# Patient Record
Sex: MALE | Race: WHITE | Employment: OTHER | URBAN - METROPOLITAN AREA
[De-identification: names, ages, dates, MRNs, and addresses within clinical notes are randomized per-mention and may not be internally consistent; named-entity substitution may affect disease eponyms.]

---

## 2024-02-09 RX ORDER — NITROGLYCERIN 0.4 MG/1
0.4 TABLET SUBLINGUAL
COMMUNITY
Start: 2024-01-20 | End: 2025-01-19

## 2024-02-09 RX ORDER — TRAZODONE HYDROCHLORIDE 100 MG/1
100 TABLET ORAL NIGHTLY
COMMUNITY
Start: 2024-01-05

## 2024-02-09 RX ORDER — LOSARTAN POTASSIUM 25 MG/1
12.5 TABLET ORAL DAILY
COMMUNITY
Start: 2024-01-20 | End: 2024-02-19

## 2024-02-09 RX ORDER — ASPIRIN 81 MG/1
81 TABLET ORAL DAILY
COMMUNITY
Start: 2024-01-20 | End: 2024-02-19

## 2024-02-09 RX ORDER — QUETIAPINE FUMARATE 50 MG/1
1 TABLET, FILM COATED ORAL NIGHTLY
COMMUNITY
Start: 2024-01-05

## 2024-02-09 RX ORDER — MIRTAZAPINE 30 MG/1
30 TABLET, FILM COATED ORAL NIGHTLY
COMMUNITY
Start: 2024-01-05

## 2024-02-09 RX ORDER — LORAZEPAM 0.5 MG/1
0.5 TABLET ORAL NIGHTLY
COMMUNITY
Start: 2024-01-05

## 2024-02-09 RX ORDER — TAMSULOSIN HYDROCHLORIDE 0.4 MG/1
0.4 CAPSULE ORAL NIGHTLY
COMMUNITY
Start: 2024-01-05

## 2024-02-09 RX ORDER — CARVEDILOL 3.12 MG/1
3.12 TABLET ORAL 2 TIMES DAILY WITH MEALS
COMMUNITY
Start: 2024-01-05

## 2024-02-21 RX ORDER — LANSOPRAZOLE 30 MG/1
30 CAPSULE, DELAYED RELEASE ORAL
COMMUNITY

## 2024-02-27 ENCOUNTER — HOSPITAL ENCOUNTER (OUTPATIENT)
Dept: INTERVENTIONAL RADIOLOGY/VASCULAR | Facility: HOSPITAL | Age: 79
Discharge: HOME | End: 2024-02-27
Attending: INTERNAL MEDICINE | Admitting: INTERNAL MEDICINE
Payer: COMMERCIAL

## 2024-02-27 VITALS
HEART RATE: 78 BPM | RESPIRATION RATE: 18 BRPM | SYSTOLIC BLOOD PRESSURE: 109 MMHG | DIASTOLIC BLOOD PRESSURE: 64 MMHG | TEMPERATURE: 96 F | WEIGHT: 175 LBS | BODY MASS INDEX: 24.5 KG/M2 | HEIGHT: 71 IN | OXYGEN SATURATION: 96 %

## 2024-02-27 DIAGNOSIS — Z01.818 PRE-OP TESTING: Primary | ICD-10-CM

## 2024-02-27 DIAGNOSIS — I25.10 ATHEROSCLEROTIC HEART DISEASE: ICD-10-CM

## 2024-02-27 LAB
ISTAT ACTIVATED CLOTTING TIME: 298 SECONDS (ref 125–137)
ISTAT ACTIVATED CLOTTING TIME: 347 SECONDS (ref 125–137)

## 2024-02-27 PROCEDURE — 02703ZZ DILATION OF CORONARY ARTERY, ONE ARTERY, PERCUTANEOUS APPROACH: ICD-10-PCS | Performed by: INTERNAL MEDICINE

## 2024-02-27 PROCEDURE — 93454 CORONARY ARTERY ANGIO S&I: CPT | Performed by: INTERNAL MEDICINE

## 2024-02-27 PROCEDURE — B2131ZZ FLUOROSCOPY OF MULTIPLE CORONARY ARTERY BYPASS GRAFTS USING LOW OSMOLAR CONTRAST: ICD-10-PCS | Performed by: INTERNAL MEDICINE

## 2024-02-27 PROCEDURE — C1725 CATH, TRANSLUMIN NON-LASER: HCPCS

## 2024-02-27 PROCEDURE — 99152 MOD SED SAME PHYS/QHP 5/>YRS: CPT | Performed by: INTERNAL MEDICINE

## 2024-02-27 PROCEDURE — B2111ZZ FLUOROSCOPY OF MULTIPLE CORONARY ARTERIES USING LOW OSMOLAR CONTRAST: ICD-10-PCS | Performed by: INTERNAL MEDICINE

## 2024-02-27 PROCEDURE — 99153 MOD SED SAME PHYS/QHP EA: CPT | Performed by: INTERNAL MEDICINE

## 2024-02-27 PROCEDURE — 85347 COAGULATION TIME ACTIVATED: CPT

## 2024-02-27 PROCEDURE — 93455 CORONARY ART/GRFT ANGIO S&I: CPT | Performed by: INTERNAL MEDICINE

## 2024-02-27 PROCEDURE — 92937 PRQ TRLUML REVSC CAB GRF 1: CPT | Performed by: INTERNAL MEDICINE

## 2024-02-27 RX ORDER — HEPARIN SODIUM 1000 [USP'U]/ML
INJECTION, SOLUTION INTRAVENOUS; SUBCUTANEOUS
Status: COMPLETED
Start: 2024-02-27 | End: 2024-02-27

## 2024-02-27 RX ORDER — ASPIRIN 81 MG/1
81 TABLET ORAL DAILY
Qty: 30 TABLET | Refills: 1 | Status: SHIPPED | OUTPATIENT
Start: 2024-02-27

## 2024-02-27 RX ORDER — SODIUM CHLORIDE 9 MG/ML
INJECTION, SOLUTION INTRAVENOUS
Status: DISCONTINUED | OUTPATIENT
Start: 2024-02-27 | End: 2024-02-27

## 2024-02-27 RX ORDER — LIDOCAINE HYDROCHLORIDE 20 MG/ML
INJECTION, SOLUTION EPIDURAL; INFILTRATION; INTRACAUDAL; PERINEURAL
Status: COMPLETED
Start: 2024-02-27 | End: 2024-02-27

## 2024-02-27 RX ORDER — ASPIRIN 81 MG/1
324 TABLET, CHEWABLE ORAL ONCE
Status: DISCONTINUED | OUTPATIENT
Start: 2024-02-27 | End: 2024-02-27

## 2024-02-27 RX ORDER — VERAPAMIL HYDROCHLORIDE 2.5 MG/ML
INJECTION, SOLUTION INTRAVENOUS
Status: COMPLETED
Start: 2024-02-27 | End: 2024-02-27

## 2024-02-27 RX ORDER — ASPIRIN 81 MG/1
324 TABLET, CHEWABLE ORAL ONCE
Status: COMPLETED | OUTPATIENT
Start: 2024-02-27 | End: 2024-02-27

## 2024-02-27 RX ORDER — RIVAROXABAN 15 MG/1
TABLET, FILM COATED ORAL
COMMUNITY
End: 2024-02-27

## 2024-02-27 RX ORDER — ASPIRIN 81 MG/1
TABLET, CHEWABLE ORAL
Status: DISCONTINUED
Start: 2024-02-27 | End: 2024-02-27

## 2024-02-27 RX ORDER — CHLORHEXIDINE GLUCONATE 4 G/100ML
30 SOLUTION TOPICAL
Status: DISCONTINUED | OUTPATIENT
Start: 2024-02-27 | End: 2024-02-27

## 2024-02-27 RX ORDER — NITROGLYCERIN 20 MG/100ML
INJECTION INTRAVENOUS
Status: COMPLETED
Start: 2024-02-27 | End: 2024-02-27

## 2024-02-27 RX ORDER — MIDAZOLAM HYDROCHLORIDE 1 MG/ML
INJECTION INTRAMUSCULAR; INTRAVENOUS
Status: COMPLETED
Start: 2024-02-27 | End: 2024-02-27

## 2024-02-27 RX ADMIN — ASPIRIN 324 MG: 81 TABLET, CHEWABLE ORAL at 14:55:00

## 2024-02-27 NOTE — PROCEDURES
[facility]    PATIENT'S NAME: NINA DOOLEY   ATTENDING PHYSICIAN: Eladio Glass DO   OPERATING PHYSICIAN: Eladio Glass DO   PATIENT ACCOUNT#:   [id number]    LOCATION:  [place] [address]  MEDICAL RECORD #:   [id number]       YOB: 1945  ADMISSION DATE:       02/27/2024      OPERATION DATE:  02/27/2024    CARDIAC PROCEDURE TRANSCRIPTION      CARDIAC CATHETERIZATION/PERCUTANEOUS CORONARY INTERVENTION   PREOPERATIVE DIAGNOSIS:    POSTOPERATIVE DIAGNOSIS:    PROCEDURE PERFORMED:    1.   Percutaneous transluminal coronary angioplasty of the saphenous vein graft to the left anterior descending.  2.   Bilateral coronary angiography.    SEDATION:  Conscious sedation was performed for 1 hour with an independent nurse monitoring the patient's blood pressure, oximetry, and heart rate.      DESCRIPTION OF PROCEDURE:  Informed consent was obtained.  Patient was placed in the cardiac catheterization lab.  Right wrist was sterilely draped and prepped.  Following 1% lidocaine anesthesia, the right radial artery was cannulated.  A 6-Bahamian sheath was inserted.  We used the 6-Bahamian LCB guide catheter to engage the saphenous vein graft to the left anterior descending.  Previously, there was partial obstruction with a large thrombus.  When we came back this time after 1 month of anticoagulation because of the very large thrombus that could not be evacuated, we noted total occlusion.  We tried with a Whisper wire, followed by a  wire.  The  200 wire was able to get to the middle part of the vessel, but then it would not go any further.  We took a 1.25 mm balloon for support, and it still would not pass any further.  We then did a native angiography with [employer] left 3.5 guide catheter.  We did do angioplasty multiple times in the proximal portion of the vein graft up to 12 atmospheres into the saphenous vein graft to the LAD.  Native angiography demonstrated a flush occlusion of the mid  left anterior descending artery right at a septal branch with an ambiguous cap.  We tried wiring from this direction, but the wire kept going into the septal branch.  We then came back and did a right coronary angiography.  We could see that there is a large right coronary artery which is widely patent.  The distal right coronary artery provides a collateral to the left anterior descending artery.  The circumflex coronary artery visualized from left coronary angiography is widely patent without obstruction.      IMPRESSION:    1.   Patent circumflex coronary artery as well as right coronary artery with right coronary artery providing collaterals to the distal left anterior descending artery.  2.   Chronically occluded left anterior descending artery.  3.   Totally occluded saphenous vein graft to the left anterior descending artery.    RECOMMENDATIONS:  Patient to be brought back for retrograde  intervention of the left anterior descending artery.     Dictated By Eladio Glass DO  d: 02/27/2024 17:21:26  t: 02/27/2024 17:44:12  Job [id number]  AS/

## 2024-02-27 NOTE — IVS NOTE
DISCHARGE NOTE     Pt is able to sit up and ambulate without difficulty.   Pt voided and tolerated fluids and food.   Right radial procedural site remains dry and intact with good circulation, motion and sensation.  Armboard in place.   No signs or symptoms of bleeding/hematoma noted.   Pt denies any pain or discomfort at this time.  IV access removed  Instructions provided, patient/family verbalizes understanding.   Dr. Glass spoke with patient/family post procedure.     Pt discharge via wheelchair to [facility]      Follow up Appointment: n/a - per Dr. Glass no need for office visit, will be coming back in March for     New Prescription: n/a

## 2024-02-27 NOTE — BRIEF OP NOTE
Preop diagnosis: CAD  Post op diagnosis: CAD  Procedures: Coronary angiogram, PTCA of SVG to LAD  Findings: 100% of SVG to LAD attempted PTCA.  of LAD. LCx and RCA patent. Plan for retrograde PCI of  to LAD.  EBL: 20 mls  Specimens: None

## 2024-02-27 NOTE — DISCHARGE INSTRUCTIONS
TRANSRADIAL ANGIOGRAM DISCHARGE INSTRUCTIONS AND HOME CARE    Activity    DO NOT drive after the procedure.  You may resume driving late the following day according to the nurse or physician's instructions  Plan on resting and relaxing tonight and tomorrow  Resume your normal activity after 48 hours, or as instructed by your physician  Avoid drinking alcohol for the next 24 hours  Avoid wrist flexion, extension, and fine motor activities (i.e. texting, typing, using computer mouse, etc.) for 24 hours.  The armboard will help remind you not to do these motions.   Do not lift or pull anything heavier than 5 to 8 pounds and avoid pushing up with the affected hand for 1 week    What is Normal?    A small lump at the procedure site associated with mild tenderness when touched  The procedure site may be bruised or discolored  There may be a small amount of drainage on the bandage    Special Instructions     Drink plenty of fluids during the next 24 hours to \"flush\" the contrast from your system  Keep the bandage clean and dry  After 24 hours, you remove the bandage and armboard.  Remove the dressing and armboard tomorrow anytime after ____.  Do not put ointment, powders, or creams to site.   You can shower after removing the bandage, and wash the procedure site gently with soap and water  DO NOT submerge the procedure site for 1 week (no bath tubs, pools or washing dishes)  If you choose to wear a bandage for a few days, make sure it remains clean and dry and that it is changed daily  For local swelling: apply ice  Bleeding can occur at the procedure site - both on the outside of the skin and/or beneath the surface of the skin        If bleeding occurs:   Elevate hand above heart and apply pressure to the procedure site with 2-3 fingers, as instructed by the nurse.  Hold pressure for 20 minutes and the bleeding should stop.  Notify your physician of the occurrence.  If the bleeding does not stop, call 911 and continue  to apply pressure    Additional Instructions:  Do not take glucophage/metformin containing products for at least 48 hours after procedure, unless otherwise directed by your physician.    When to contact physician: Call                                                         right away if you experience     Increased swelling or a large lump, pain or bleeding at the site that is not relieved by applying ice or pressure  Signs of infection: Redness, warmth, drainage at the site, chills, or temperature of 100.5 or greater  Changes in sensation, numbness, or tingling of affected hand      Other    You may resume your present diet, unless otherwise specified by your physician.    You may resume all of your medications as prescribed, unless otherwise directed by your physician.  A list of your medications was provided to you at discharge.  Gradually resume your previous aerobic exercise schedule as directed by your physician.      If you have any question or concern, please call the on-call nurse at [phone]

## 2024-02-28 NOTE — CM/SW NOTE
Called by JOSHUA Anders and was asked to call Rn to assist with transportation for patient to get home    Spoke with wife on phone who stated she already called [facility] to pick him up and take him home    Wife states she already has home health    Wife states \"every  wants to talk with me but no one can help me\" and hung up the phone

## 2024-03-19 ENCOUNTER — APPOINTMENT (OUTPATIENT)
Dept: GENERAL RADIOLOGY | Facility: HOSPITAL | Age: 79
End: 2024-03-19
Payer: COMMERCIAL

## 2024-03-19 ENCOUNTER — HOSPITAL ENCOUNTER (INPATIENT)
Dept: INTERVENTIONAL RADIOLOGY/VASCULAR | Facility: HOSPITAL | Age: 79
LOS: 6 days | Discharge: HOME HEALTH | End: 2024-03-25
Attending: INTERNAL MEDICINE | Admitting: INTERNAL MEDICINE
Payer: COMMERCIAL

## 2024-03-19 DIAGNOSIS — I25.119 ATHEROSCLEROTIC HEART DISEASE OF NATIVE CORONARY ARTERY WITH ANGINA PECTORIS (HCC): ICD-10-CM

## 2024-03-19 LAB
ANION GAP SERPL CALC-SCNC: 9 MMOL/L (ref 0–18)
BASE EXCESS BLD CALC-SCNC: -3.1 MMOL/L (ref ?–2)
BASOPHILS # BLD AUTO: 0.03 X10(3) UL (ref 0–0.2)
BASOPHILS NFR BLD AUTO: 0.2 %
BNP SERPL-MCNC: 24 PG/ML
BUN BLD-MCNC: 12 MG/DL (ref 9–23)
BUN/CREAT SERPL: 10 (ref 10–20)
CALCIUM BLD-MCNC: 9.3 MG/DL (ref 8.7–10.4)
CHLORIDE SERPL-SCNC: 109 MMOL/L (ref 98–112)
CO2 SERPL-SCNC: 18 MMOL/L (ref 21–32)
CREAT BLD-MCNC: 1.2 MG/DL
DEPRECATED RDW RBC AUTO: 64.9 FL (ref 35.1–46.3)
EGFRCR SERPLBLD CKD-EPI 2021: 62 ML/MIN/1.73M2 (ref 60–?)
EOSINOPHIL # BLD AUTO: 0.03 X10(3) UL (ref 0–0.7)
EOSINOPHIL NFR BLD AUTO: 0.2 %
ERYTHROCYTE [DISTWIDTH] IN BLOOD BY AUTOMATED COUNT: 18.9 % (ref 11–15)
GLUCOSE BLD-MCNC: 124 MG/DL (ref 70–99)
HCO3 BLDA-SCNC: 22.3 MEQ/L (ref 21–27)
HCT VFR BLD AUTO: 44.1 %
HGB BLD-MCNC: 14 G/DL
IMM GRANULOCYTES # BLD AUTO: 0.07 X10(3) UL (ref 0–1)
IMM GRANULOCYTES NFR BLD: 0.4 %
ISTAT ACTIVATED CLOTTING TIME: 196 SECONDS (ref 125–137)
ISTAT ACTIVATED CLOTTING TIME: 234 SECONDS (ref 125–137)
ISTAT ACTIVATED CLOTTING TIME: 271 SECONDS (ref 125–137)
ISTAT ACTIVATED CLOTTING TIME: 347 SECONDS (ref 125–137)
ISTAT ACTIVATED CLOTTING TIME: 363 SECONDS (ref 125–137)
LYMPHOCYTES # BLD AUTO: 0.7 X10(3) UL (ref 1–4)
LYMPHOCYTES NFR BLD AUTO: 3.8 %
MCH RBC QN AUTO: 29.8 PG (ref 26–34)
MCHC RBC AUTO-ENTMCNC: 31.7 G/DL (ref 31–37)
MCV RBC AUTO: 93.8 FL
MODIFIED ALLEN TEST: POSITIVE
MONOCYTES # BLD AUTO: 1.47 X10(3) UL (ref 0.1–1)
MONOCYTES NFR BLD AUTO: 7.9 %
NEUTROPHILS # BLD AUTO: 16.23 X10 (3) UL (ref 1.5–7.7)
NEUTROPHILS # BLD AUTO: 16.23 X10(3) UL (ref 1.5–7.7)
NEUTROPHILS NFR BLD AUTO: 87.5 %
O2 CT BLD-SCNC: 17.4 VOL% (ref 15–23)
OSMOLALITY SERPL CALC.SUM OF ELEC: 283 MOSM/KG (ref 275–295)
OXYGEN LITERS/MINUTE: 5 L/MIN
PCO2 BLDA: 39 MM HG (ref 35–45)
PH BLDA: 7.36 [PH] (ref 7.35–7.45)
PLATELET # BLD AUTO: 271 10(3)UL (ref 150–450)
PO2 BLDA: 62 MM HG (ref 80–100)
POTASSIUM SERPL-SCNC: 4.3 MMOL/L (ref 3.5–5.1)
PUNCTURE CHARGE: YES
RBC # BLD AUTO: 4.7 X10(6)UL
SAO2 % BLDA: 94 % (ref 94–100)
SARS-COV-2 RNA RESP QL NAA+PROBE: NOT DETECTED
SODIUM SERPL-SCNC: 136 MMOL/L (ref 136–145)
WBC # BLD AUTO: 18.5 X10(3) UL (ref 4–11)

## 2024-03-19 PROCEDURE — 99223 1ST HOSP IP/OBS HIGH 75: CPT | Performed by: HOSPITALIST

## 2024-03-19 PROCEDURE — B241ZZ3 ULTRASONOGRAPHY OF MULTIPLE CORONARY ARTERIES, INTRAVASCULAR: ICD-10-PCS | Performed by: INTERNAL MEDICINE

## 2024-03-19 PROCEDURE — 71045 X-RAY EXAM CHEST 1 VIEW: CPT

## 2024-03-19 PROCEDURE — 027236Z DILATION OF CORONARY ARTERY, THREE ARTERIES WITH THREE DRUG-ELUTING INTRALUMINAL DEVICES, PERCUTANEOUS APPROACH: ICD-10-PCS | Performed by: INTERNAL MEDICINE

## 2024-03-19 RX ORDER — MIDAZOLAM HYDROCHLORIDE 1 MG/ML
INJECTION INTRAMUSCULAR; INTRAVENOUS
Status: COMPLETED
Start: 2024-03-19 | End: 2024-03-19

## 2024-03-19 RX ORDER — ASPIRIN 81 MG/1
324 TABLET, CHEWABLE ORAL ONCE
Status: DISCONTINUED | OUTPATIENT
Start: 2024-03-19 | End: 2024-03-19

## 2024-03-19 RX ORDER — FUROSEMIDE 10 MG/ML
20 INJECTION INTRAMUSCULAR; INTRAVENOUS ONCE
Status: DISCONTINUED | OUTPATIENT
Start: 2024-03-19 | End: 2024-03-19

## 2024-03-19 RX ORDER — CARVEDILOL 3.12 MG/1
3.12 TABLET ORAL 2 TIMES DAILY WITH MEALS
Status: DISCONTINUED | OUTPATIENT
Start: 2024-03-20 | End: 2024-03-25

## 2024-03-19 RX ORDER — LIDOCAINE HYDROCHLORIDE 20 MG/ML
INJECTION, SOLUTION INFILTRATION; PERINEURAL
Status: COMPLETED
Start: 2024-03-19 | End: 2024-03-19

## 2024-03-19 RX ORDER — PANTOPRAZOLE SODIUM 40 MG/1
40 TABLET, DELAYED RELEASE ORAL
Status: DISCONTINUED | OUTPATIENT
Start: 2024-03-20 | End: 2024-03-25

## 2024-03-19 RX ORDER — ASPIRIN 81 MG/1
81 TABLET ORAL DAILY
Status: DISCONTINUED | OUTPATIENT
Start: 2024-03-20 | End: 2024-03-19

## 2024-03-19 RX ORDER — ONDANSETRON 2 MG/ML
4 INJECTION INTRAMUSCULAR; INTRAVENOUS EVERY 6 HOURS PRN
Status: DISCONTINUED | OUTPATIENT
Start: 2024-03-19 | End: 2024-03-25

## 2024-03-19 RX ORDER — CHLORHEXIDINE GLUCONATE 4 G/100ML
30 SOLUTION TOPICAL
Status: ACTIVE | OUTPATIENT
Start: 2024-03-19 | End: 2024-03-19

## 2024-03-19 RX ORDER — ONDANSETRON 2 MG/ML
INJECTION INTRAMUSCULAR; INTRAVENOUS
Status: COMPLETED
Start: 2024-03-19 | End: 2024-03-19

## 2024-03-19 RX ORDER — NITROGLYCERIN 20 MG/100ML
INJECTION INTRAVENOUS
Status: COMPLETED
Start: 2024-03-19 | End: 2024-03-19

## 2024-03-19 RX ORDER — FUROSEMIDE 10 MG/ML
40 INJECTION INTRAMUSCULAR; INTRAVENOUS ONCE
Status: DISCONTINUED | OUTPATIENT
Start: 2024-03-19 | End: 2024-03-19

## 2024-03-19 RX ORDER — SODIUM CHLORIDE 9 MG/ML
INJECTION, SOLUTION INTRAVENOUS
Status: COMPLETED | OUTPATIENT
Start: 2024-03-19 | End: 2024-03-19

## 2024-03-19 RX ORDER — SODIUM CHLORIDE 9 MG/ML
INJECTION, SOLUTION INTRAVENOUS CONTINUOUS
Status: DISCONTINUED | OUTPATIENT
Start: 2024-03-19 | End: 2024-03-19

## 2024-03-19 RX ORDER — ACETAMINOPHEN 10 MG/ML
1000 INJECTION, SOLUTION INTRAVENOUS EVERY 6 HOURS PRN
Status: DISCONTINUED | OUTPATIENT
Start: 2024-03-19 | End: 2024-03-22

## 2024-03-19 RX ORDER — CEFAZOLIN SODIUM/WATER 2 G/20 ML
SYRINGE (ML) INTRAVENOUS
Status: COMPLETED
Start: 2024-03-19 | End: 2024-03-19

## 2024-03-19 RX ORDER — HEPARIN SODIUM 1000 [USP'U]/ML
INJECTION, SOLUTION INTRAVENOUS; SUBCUTANEOUS
Status: COMPLETED
Start: 2024-03-19 | End: 2024-03-19

## 2024-03-19 RX ORDER — LOSARTAN POTASSIUM 25 MG/1
12.5 TABLET ORAL DAILY
Status: DISCONTINUED | OUTPATIENT
Start: 2024-03-20 | End: 2024-03-25

## 2024-03-19 RX ORDER — FUROSEMIDE 10 MG/ML
20 INJECTION INTRAMUSCULAR; INTRAVENOUS ONCE
Status: COMPLETED | OUTPATIENT
Start: 2024-03-19 | End: 2024-03-19

## 2024-03-19 RX ORDER — ASPIRIN 81 MG/1
81 TABLET ORAL DAILY
Status: DISCONTINUED | OUTPATIENT
Start: 2024-03-20 | End: 2024-03-25

## 2024-03-19 RX ADMIN — FUROSEMIDE 20 MG: 10 INJECTION INTRAMUSCULAR; INTRAVENOUS at 18:45:00

## 2024-03-19 RX ADMIN — SODIUM CHLORIDE: 9 INJECTION, SOLUTION INTRAVENOUS at 11:45:00

## 2024-03-19 RX ADMIN — ONDANSETRON 4 MG: 2 INJECTION INTRAMUSCULAR; INTRAVENOUS at 17:30:00

## 2024-03-19 RX ADMIN — FUROSEMIDE 20 MG: 10 INJECTION INTRAMUSCULAR; INTRAVENOUS at 20:15:00

## 2024-03-19 NOTE — BRIEF OP NOTE
Preop diagnosis: nstemi and cad  Post op diagnosis: same  Procedures: LAD  PCI, LCX Om1 stent, Left main stent. IVUS guidance.  Findings: As above.  EBL: 20 mls  Specimens: None

## 2024-03-19 NOTE — IVS NOTE
Procedure hand off report given to MARY Mckeon. Procedural access site is dry and intact with no signs and symptoms of bleeding and hematoma. Dr Glass came to see pt /family at bedside post procedure. Bilateral groin site check done with Linda HERNANDEZ, upon transfer to floor. Pt is generally stable.  Stent card given to the wife.

## 2024-03-19 NOTE — PROGRESS NOTES
Patient arrived to [facility] s/p PCI and stenting of  to the LAD, LCX OM1 and left main    Tachypnea s/p angiogram  Increasing o2 requirements  HX HFrEF with last known EF ~35%  HX CAD     Will check stat CXR  -ABGs  -BMP  -BNP    May be volume overloaded, may benefit from diuresis  Will follow up and endorse to cardiology    Giuliana Mann NP  [facility]    Lasix 40mg X1 dose ordered per cardiology recommendation

## 2024-03-19 NOTE — H&P
Patient is a 78 year old male who is here for LAD  PCI. H/o NSTEMI  Post procedure patient desaturated. No cp currently. Had some groin oozing. Still requiring pressors.   Multiple re-evaluations of the patient.     PMH CVA, CAD NSTEMI, HTN, Chronic systolic chf 35%, SVG to LAD, AVR.    Past Medical History:   No past medical history on file.    Past Surgical History:  No past surgical history on file.    Family History:  No family history on file.    Social History:  Pediatric History   Patient Parents    Not on file     Other Topics Concern    Not on file   Social History Narrative    Not on file           Current Medications:  Current Facility-Administered Medications   Medication Dose Route Frequency    chlorhexidine (Hibiclens) 4 % external liquid 30 mL  30 mL Topical On Call    aspirin chewable tab 324 mg  324 mg Oral Once     Medications Prior to Admission   Medication Sig    lansoprazole 30 MG Oral Capsule Delayed Release Take 1 capsule (30 mg total) by mouth every morning before breakfast.    aspirin 81 MG Oral Tab EC Take 1 tablet (81 mg total) by mouth daily.    ticagrelor 90 MG Oral Tab Take 1 tablet (90 mg total) by mouth.    carvedilol 3.125 MG Oral Tab Take 1 tablet (3.125 mg total) by mouth 2 (two) times daily with meals.    LORazepam 0.5 MG Oral Tab Take 1 tablet (0.5 mg total) by mouth nightly.    losartan 25 MG Oral Tab Take 0.5 tablets (12.5 mg total) by mouth daily.    mirtazapine 30 MG Oral Tab Take 1 tablet (30 mg total) by mouth nightly.    QUEtiapine 50 MG Oral Tab Take 1 tablet (50 mg total) by mouth nightly.    tamsulosin 0.4 MG Oral Cap Take 1 capsule (0.4 mg total) by mouth nightly.    traZODone 100 MG Oral Tab Take 1 tablet (100 mg total) by mouth nightly.    aspirin 81 MG Oral Tab EC Take 1 tablet (81 mg total) by mouth daily.    nitroglycerin 0.4 MG Sublingual SL Tab Place 1 tablet (0.4 mg total) under the tongue.       Allergies:  Allergies   Allergen Reactions    Amiodarone  MYALGIA     Stroke like symptoms       Review of Systems:   A comprehensive 12 point review of system was performed.  All other review of systems are negative.    Physical Exam:   Blood pressure 144/87, pulse 67, temperature 97 °F (36.1 °C), temperature source Temporal, resp. rate 23, height 5' 11\" (1.803 m), weight 175 lb (79.4 kg), SpO2 99%.  Intake/Output:   Last 3 shifts: [id number]@   This shift: No intake/output data recorded.     Vent Settings:      Hemodynamic parameters (last 24 hours):      Scheduled Meds:    chlorhexidine  30 mL Topical On Call    aspirin  324 mg Oral Once       Continuous Infusions:       Physical Exam:    General: No acute distress.  HEENT: No scleral icterus.  External ears are normal.  Lids are normal.  Oral mucosa is moist.  Neck: No JVD, no bruits.  Cardiac: Normal rate, No murmur.  Lungs: Clear without rhales, rhochi or wheezing.  Abdomen: Soft, non-tender. No abdominal bruit. No hepatojugular reflux.  Extremities: No edema.    Neurologic: Alert and moving all 4 extremities.  Psychiatry: Patient has calm affect.  Lymphatic: No cervical or lower extremity lymphadenopathy.  Skin/nails: No clubbing.  Musculoskeletal: No joint effusions.    Results:     Laboratory Data:  No results found for: \"WBC\", \"HGB\", \"HCT\", \"PLT\", \"CREATSERUM\", \"BUN\", \"NA\", \"K\", \"CL\", \"CO2\", \"GLU\", \"CA\", \"ALB\", \"ALKPHO\", \"TP\", \"AST\", \"ALT\", \"PTT\", \"INR\", \"PTP\", \"T4F\", \"TSH\", \"TSHREFLEX\", \"MADI\", \"LIP\", \"GGT\", \"PSA\", \"DDIMER\", \"ESRML\", \"ESRPF\", \"CRP\", \"BNP\", \"MG\", \"PHOS\", \"TROP\", \"CK\", \"CKMB\", \"FRANCIA\", \"RPR\", \"B12\", \"ETOH\", \"POCGLU\"      Imaging:  [unfilled]        IMPRESSION:  CAD with LAD and SVG to LAD   Chronic systolic chf  CVA  Paroxysmal afib, post op no recurrence.    PLAN:  LAD  PCI using retrograde via the SVG to LAD.  Addendum: Patient was noted to be tachypneic after the procedure. On examination patient has some tachypnea and JVD and crackles. Chest x-ray demonstrating pulmonary vascular  congestion. Chest x-ray image personally reviewed. We will give furosemide 20 mg IV x 1 and then repeat as needed. We will start dual antiplatelet therapy hold off on other heart failure medications while the patient is on Levophed. Patient had a small hematoma of the right groin due to some mild oozing. On manual pressure this improved. FemoStop to be applied at low pressure to prevent reaccumulation. Discussed with nurse.     Eladio Glass DO [facility]  [address]  [place] [address] [phone]

## 2024-03-20 ENCOUNTER — APPOINTMENT (OUTPATIENT)
Dept: GENERAL RADIOLOGY | Facility: HOSPITAL | Age: 79
End: 2024-03-20
Attending: HOSPITALIST
Payer: COMMERCIAL

## 2024-03-20 ENCOUNTER — APPOINTMENT (OUTPATIENT)
Dept: CV DIAGNOSTICS | Facility: HOSPITAL | Age: 79
End: 2024-03-20
Attending: INTERNAL MEDICINE
Payer: COMMERCIAL

## 2024-03-20 LAB
ANION GAP SERPL CALC-SCNC: 10 MMOL/L (ref 0–18)
ATRIAL RATE: 112 BPM
ATRIAL RATE: 86 BPM
BASOPHILS # BLD AUTO: 0.04 X10(3) UL (ref 0–0.2)
BASOPHILS NFR BLD AUTO: 0.3 %
BILIRUB UR QL: NEGATIVE
BUN BLD-MCNC: 23 MG/DL (ref 9–23)
BUN/CREAT SERPL: 17 (ref 10–20)
CALCIUM BLD-MCNC: 9.5 MG/DL (ref 8.7–10.4)
CHLORIDE SERPL-SCNC: 105 MMOL/L (ref 98–112)
CLARITY UR: CLEAR
CO2 SERPL-SCNC: 21 MMOL/L (ref 21–32)
CREAT BLD-MCNC: 1.35 MG/DL
DEPRECATED RDW RBC AUTO: 64.1 FL (ref 35.1–46.3)
EGFRCR SERPLBLD CKD-EPI 2021: 54 ML/MIN/1.73M2 (ref 60–?)
EOSINOPHIL # BLD AUTO: 0.05 X10(3) UL (ref 0–0.7)
EOSINOPHIL NFR BLD AUTO: 0.3 %
ERYTHROCYTE [DISTWIDTH] IN BLOOD BY AUTOMATED COUNT: 18.7 % (ref 11–15)
GLUCOSE BLD-MCNC: 129 MG/DL (ref 70–99)
GLUCOSE UR-MCNC: NORMAL MG/DL
HCT VFR BLD AUTO: 39.6 %
HGB BLD-MCNC: 12.6 G/DL
IMM GRANULOCYTES # BLD AUTO: 0.06 X10(3) UL (ref 0–1)
IMM GRANULOCYTES NFR BLD: 0.4 %
KETONES UR-MCNC: NEGATIVE MG/DL
LEUKOCYTE ESTERASE UR QL STRIP.AUTO: 25
LYMPHOCYTES # BLD AUTO: 1.03 X10(3) UL (ref 1–4)
LYMPHOCYTES NFR BLD AUTO: 6.5 %
MCH RBC QN AUTO: 29.4 PG (ref 26–34)
MCHC RBC AUTO-ENTMCNC: 31.8 G/DL (ref 31–37)
MCV RBC AUTO: 92.5 FL
MONOCYTES # BLD AUTO: 0.83 X10(3) UL (ref 0.1–1)
MONOCYTES NFR BLD AUTO: 5.2 %
MRSA DNA SPEC QL NAA+PROBE: NEGATIVE
NEUTROPHILS # BLD AUTO: 13.83 X10 (3) UL (ref 1.5–7.7)
NEUTROPHILS # BLD AUTO: 13.83 X10(3) UL (ref 1.5–7.7)
NEUTROPHILS NFR BLD AUTO: 87.3 %
NITRITE UR QL STRIP.AUTO: NEGATIVE
OSMOLALITY SERPL CALC.SUM OF ELEC: 287 MOSM/KG (ref 275–295)
P AXIS: 67 DEGREES
P AXIS: 69 DEGREES
P-R INTERVAL: 134 MS
P-R INTERVAL: 142 MS
PH UR: 5 [PH] (ref 5–8)
PLATELET # BLD AUTO: 280 10(3)UL (ref 150–450)
POTASSIUM SERPL-SCNC: 4.8 MMOL/L (ref 3.5–5.1)
PROT UR-MCNC: 20 MG/DL
Q-T INTERVAL: 340 MS
Q-T INTERVAL: 354 MS
QRS DURATION: 90 MS
QRS DURATION: 90 MS
QTC CALCULATION (BEZET): 423 MS
QTC CALCULATION (BEZET): 464 MS
R AXIS: 27 DEGREES
R AXIS: 71 DEGREES
RBC # BLD AUTO: 4.28 X10(6)UL
SODIUM SERPL-SCNC: 136 MMOL/L (ref 136–145)
SP GR UR STRIP: >1.03 (ref 1–1.03)
T AXIS: 77 DEGREES
T AXIS: 91 DEGREES
UROBILINOGEN UR STRIP-ACNC: NORMAL
VENTRICULAR RATE: 112 BPM
VENTRICULAR RATE: 86 BPM
WBC # BLD AUTO: 15.8 X10(3) UL (ref 4–11)

## 2024-03-20 PROCEDURE — 93306 TTE W/DOPPLER COMPLETE: CPT | Performed by: INTERNAL MEDICINE

## 2024-03-20 PROCEDURE — 99223 1ST HOSP IP/OBS HIGH 75: CPT | Performed by: INTERNAL MEDICINE

## 2024-03-20 PROCEDURE — 36556 INSERT NON-TUNNEL CV CATH: CPT | Performed by: HOSPITALIST

## 2024-03-20 PROCEDURE — 99233 SBSQ HOSP IP/OBS HIGH 50: CPT | Performed by: HOSPITALIST

## 2024-03-20 PROCEDURE — 71045 X-RAY EXAM CHEST 1 VIEW: CPT | Performed by: HOSPITALIST

## 2024-03-20 RX ORDER — IPRATROPIUM BROMIDE AND ALBUTEROL SULFATE 2.5; .5 MG/3ML; MG/3ML
3 SOLUTION RESPIRATORY (INHALATION) EVERY 6 HOURS PRN
Status: DISCONTINUED | OUTPATIENT
Start: 2024-03-20 | End: 2024-03-25

## 2024-03-20 RX ORDER — LORAZEPAM 0.5 MG/1
0.5 TABLET ORAL NIGHTLY
Status: DISCONTINUED | OUTPATIENT
Start: 2024-03-20 | End: 2024-03-21

## 2024-03-20 RX ORDER — SODIUM CHLORIDE 9 MG/ML
INJECTION, SOLUTION INTRAVENOUS CONTINUOUS
Status: DISCONTINUED | OUTPATIENT
Start: 2024-03-20 | End: 2024-03-20

## 2024-03-20 RX ORDER — SODIUM CHLORIDE 9 MG/ML
INJECTION, SOLUTION INTRAVENOUS CONTINUOUS
Status: DISCONTINUED | OUTPATIENT
Start: 2024-03-20 | End: 2024-03-21

## 2024-03-20 RX ORDER — NITROGLYCERIN 20 MG/100ML
INJECTION INTRAVENOUS
Status: DISCONTINUED
Start: 2024-03-20 | End: 2024-03-21

## 2024-03-20 RX ORDER — LIDOCAINE HYDROCHLORIDE 40 MG/ML
SOLUTION TOPICAL AS NEEDED
Status: DISCONTINUED | OUTPATIENT
Start: 2024-03-20 | End: 2024-03-25

## 2024-03-20 RX ADMIN — LORAZEPAM 0.5 MG: 0.5 TABLET ORAL at 23:52:00

## 2024-03-20 RX ADMIN — SODIUM CHLORIDE: 9 INJECTION, SOLUTION INTRAVENOUS at 22:43:00

## 2024-03-20 RX ADMIN — PANTOPRAZOLE SODIUM 40 MG: 40 TABLET, DELAYED RELEASE ORAL at 09:20:00

## 2024-03-20 RX ADMIN — ACETAMINOPHEN 1000 MG: 10 INJECTION, SOLUTION INTRAVENOUS at 00:51:00

## 2024-03-20 RX ADMIN — SODIUM CHLORIDE: 9 INJECTION, SOLUTION INTRAVENOUS at 19:00:00

## 2024-03-20 RX ADMIN — ACETAMINOPHEN 1000 MG: 10 INJECTION, SOLUTION INTRAVENOUS at 22:47:00

## 2024-03-20 RX ADMIN — ACETAMINOPHEN 1000 MG: 10 INJECTION, SOLUTION INTRAVENOUS at 08:45:00

## 2024-03-20 RX ADMIN — SODIUM CHLORIDE: 9 INJECTION, SOLUTION INTRAVENOUS at 18:23:00

## 2024-03-20 RX ADMIN — ASPIRIN 81 MG: 81 TABLET ORAL at 09:21:00

## 2024-03-20 NOTE — CONSULTS
[facility]  part of [facility]    Report of Consultation    Gurdeep Sagastume Patient Status:  Inpatient    1945 MRN [id number]   Location [facility] 2W/SW Attending Royer Null MD   Hosp Day # 1 PCP No primary care provider on file.     Date of Admission:  3/19/2024  Date of Consult: 3/20/2024    Reason for Consultation:   Consults  Pulmonary edema with acute hypoxemic respiratory failure  Cardiogenic shock  Ischemic cardiomyopathy    History provided by:patient  HPI:   No chief complaint on file.    HPI    78-year-old gentleman with history of severe ischemic cardiomyopathy with a known LVEF 35% with history of severe mitral regurgitation, history of CVA, A-fib on Xarelto, COPD on home O2 2 L    Patient had recent acute MI at [facility] with total occlusion of LAD with left apical thrombus and patient had another angiogram on 2024 with PCI  Patient underwent elective angiogram yesterday on 3/19/2024 for occluded LAD intervention patient had hypotension during the procedure received epinephrine and some bleeding in the groin and dropped his blood pressure and developed pulmonary edema chest x-ray showed pulmonary edema and O2 requirement went up to 8 L nasal cannula and admitted to ICU  Central line arterial line was placed and patient on low-dose of Levophed  Patient was giving Lasix and diuresed well  Denies abdominal pain or vomiting  Denied chest pain  Mild orthopnea  Denies cough or sputum or wheezes  Less dyspnea less O2 requirement after Lasix    History     1-severe cardiomyopathy /ischemic  2-coronary artery disease h/o CABG   3-HTN, HL  4-CVA with residual right-sided weakness  5-A-fib on Xarelto  6-COPD on home O2 2 L  7-CKD  8- BPH          Social History:  50-pack-year history of smoking quit last year  Social History     Socioeconomic History    Marital status:      Allergies/Medications:   Allergies:   Allergies   Allergen Reactions    Amiodarone  MYALGIA     Stroke like symptoms     Medications Prior to Admission   Medication Sig    lansoprazole 30 MG Oral Capsule Delayed Release Take 1 capsule (30 mg total) by mouth every morning before breakfast.    [] aspirin 81 MG Oral Tab EC Take 1 tablet (81 mg total) by mouth daily.    ticagrelor 90 MG Oral Tab Take 1 tablet (90 mg total) by mouth.    carvedilol 3.125 MG Oral Tab Take 1 tablet (3.125 mg total) by mouth 2 (two) times daily with meals.    LORazepam 0.5 MG Oral Tab Take 1 tablet (0.5 mg total) by mouth nightly.    [] losartan 25 MG Oral Tab Take 0.5 tablets (12.5 mg total) by mouth daily.    mirtazapine 30 MG Oral Tab Take 1 tablet (30 mg total) by mouth nightly.    QUEtiapine 50 MG Oral Tab Take 1 tablet (50 mg total) by mouth nightly.    tamsulosin 0.4 MG Oral Cap Take 1 capsule (0.4 mg total) by mouth nightly.    traZODone 100 MG Oral Tab Take 1 tablet (100 mg total) by mouth nightly.    aspirin 81 MG Oral Tab EC Take 1 tablet (81 mg total) by mouth daily.    nitroglycerin 0.4 MG Sublingual SL Tab Place 1 tablet (0.4 mg total) under the tongue.       Review of Systems:     Constitutional:  Positive for fatigue. Negative for fever.   HENT:  Negative for congestion.    Respiratory:  Positive for shortness of breath. Negative for cough and wheezing.    Cardiovascular:  Negative for chest pain and leg swelling.   Gastrointestinal: Negative.    Musculoskeletal: Negative.    Skin: Negative.    Neurological:  Negative for seizures.   Psychiatric/Behavioral:  Negative for agitation.        Physical Exam:   Vital Signs:   height is 5' 11\" (1.803 m) and weight is 152 lb 5.4 oz (69.1 kg). His temperature is 98.5 °F (36.9 °C). His blood pressure is 104/67 and his pulse is 87. His respiration is 22 and oxygen saturation is 99%.   Physical Exam  Constitutional:       General: He is not in acute distress.     Appearance: He is ill-appearing.   HENT:      Head: Atraumatic.      Nose: Nose normal.       Mouth/Throat:      Mouth: Mucous membranes are moist.   Eyes:      General: No scleral icterus.  Cardiovascular:      Rate and Rhythm: Normal rate. Rhythm irregular.      Heart sounds: Murmur heard.      No gallop.   Pulmonary:      Effort: No respiratory distress.      Breath sounds: No stridor. No wheezing or rhonchi.      Comments: Good air exchange to both lungs with mild basilar rales otherwise clear  Abdominal:      General: Abdomen is flat. Bowel sounds are normal.      Palpations: Abdomen is soft.      Tenderness: There is no guarding.   Musculoskeletal:      Cervical back: Neck supple. No rigidity.      Right lower leg: No edema.      Left lower leg: No edema.   Skin:     General: Skin is dry.   Neurological:      Mental Status: He is oriented to person, place, and time. Mental status is at baseline.         Results:     Lab Results   Component Value Date    WBC 15.8 (H) 03/20/2024    HGB 12.6 (L) 03/20/2024    HCT 39.6 03/20/2024    .0 03/20/2024    CREATSERUM 1.35 (H) 03/20/2024    BUN 23 03/20/2024     03/20/2024    K 4.8 03/20/2024     03/20/2024    CO2 21.0 03/20/2024     (H) 03/20/2024    CA 9.5 03/20/2024     XR CHEST AP PORTABLE  (CPT=71045)    Result Date: 3/19/2024  CONCLUSION: Prominent bronchovascular markings, nonspecific, may indicate scarring/COPD changes or pulmonary edema.  More focal opacities in the right upper lung may indicate infection.     elm-remote   Dictated by (CST): Hussain Guevara MD on 3/19/2024 at 7:48 PM     Finalized by (CST): Hussain Guevara MD on 3/19/2024 at 7:52 PM         EKG 12 Lead    Result Date: 3/20/2024  Sinus tachycardia Anterolateral infarct , age undetermined Abnormal ECG No previous ECGs found in [facility] Confirmed by Mike Harris ([id number]) on 3/20/2024 8:10:28 AM     Impression:       1-postprocedure acute on chronic hypoxemic respiratory failure ( pulmonary edema )   S/p cardiac cath PCI/stent LAD and OM and left main IVUS guidance   CXR  pulmonary edema with underline copd   CXR today better     O2 therapy   Lasix     2-cardiogenic shock  Supportive care and pressors  Cardiology following     3- severe ischemic cardiomyopathy LVEF 35 % h/o CABG with severe mitral regurgitation and chronic A-fib  recent NSTEMI with occluded LAD graft failed intervention on 2/27  S/p cath on 3/19 with PCI to LAD graft, OM stent, left main stent IVUS guidance    Brilinta, aspirin, Coreg  Chronically on Xarelto now temporarily on hold with bleeding from cath site    4-COPD on home O2 at 2 L  No evidence of acute exacerbation with No wheezes or cough  O2 therapy  Neb as needed    5-leukocytosis which is likely reactive and better today  Check UA and follow-up CBC in a.m.    6-DVT prophylaxis  SCD for now to start heparin subcu resume Xarelto when okay with cardiology      D/w staff                 Greta Ribeiro MD  3/20/2024

## 2024-03-20 NOTE — SLP NOTE
ADULT SWALLOWING EVALUATION    ASSESSMENT    ASSESSMENT/OVERALL IMPRESSION:  PPE REQUIRED. THIS THERAPIST WORE GLOVES, DROPLET MASK, AND GOGGLES FOR DURATION OF EVALUATION. HANDS WASHED UPON ENTRANCE/EXIT.    SLP BSSE orders received and acknowledged. A swallow evaluation warranted as pt at risk for aspiration and failed RN dysphagia screen. Pt afebrile with clear vocal quality, on 5L/Min, with oxygen saturation at 97. Pt with no prior hx of dysphagia at [facility]. Pt positioned upright in bed. Pt with no complaints of pain, RN aware. Orlam ech exam completed and overall reduced strength observed. Lower dentures at home. Pt with adequate oral acceptance and bilabial seal across all trials. Pt with intact bite, prolonged mastication of solids, and increased SHALOM. Pt's swallow response appears delayed with reduced hyolaryngeal elevation/excursion. No clinical signs of aspiration (e.g., immediate/delayed throat clear, immediate/delayed cough, wet vocal quality, increased O2 effort) observed across all trials of puree, soft solids, hard solids, and mildly thick liquids. Overt signs/symptoms of aspiration observed with thin liquids as evidenced by immediate coughing/throat clearing and gurgly vocal quality. Trials discontinued. Oral/buccal cavities clear of residue following all trials. Oxygen status remained >91 t/o the entire evaluation.     At this time, pt presents with mild oral dysphagia and probable pharyngeal dysfunction. Recommend an easy to chew diet and mildly thick liquids with strict adherence to safe swallowing compensatory strategies. Results and recommendations reviewed with RN, pt, and family. Pt/pt's family v/u to all results/recommendations. Recommendations remain written on whiteboard.     PLAN: SLP to f/u x3 meal assessments, monitor CXR, and VFSS if clinically warranted.     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    Prior Living Situation: Home with spouse  Diet Prior to Admission: Regular;Thin liquids  Precautions: Aspiration    Patient/Family Goals: Pt wants a sprite    SWALLOWING HISTORY  Current Diet Consistency: Regular;Thin liquids  Dysphagia History: None at [facility]    Imaging Results:     CXR 3/20/24:  CONCLUSION:      Right upper extremity central venous catheter with the tip projecting over the expected location of the right atrium.  Consider retraction.      Unchanged patchy bilateral airspace opacities.      Small left pleural effusion.      A preliminary report was issued by the [facility] teleradiology service. There are no clinically actionable discrepancies.            Dictated by (CST): Radha Briggs MD on 3/20/2024 at 8:28 AM       Finalized by (CST): Radha Briggs MD on 3/20/2024 at 8:30 AM         OBJECTIVE   ORAL MOTOR EXAMINATION  Dentition: Upper dentures;Lower dentures (lowers at home)  Symmetry: Within Functional Limits  Strength:  (reduced)     Range of Motion: Within Functional Limits  Rate of Motion: Within Functional Limits    Voice Quality: Clear  Respiratory Status: Supplemental O2;Nasal cannula  Consistencies Trialed: Thin liquids;Nectar thick liquids;Puree;Soft solid;Hard solid  Method of Presentation: Self presentation;Spoon;Cup;Single sips  Patient Positioning: Upright;Midline    Oral Phase of Swallow: Impaired  Bolus  Retrieval: Intact  Bilabial Seal: Intact  Bolus Formation: Impaired  Bolus Propulsion: Impaired  Mastication: Impaired       Pharyngeal Phase of Swallow: Impaired  Laryngeal Elevation Timing: Appears impaired  Laryngeal Elevation Strength: Appears impaired     (Please note: Silent aspiration cannot be evaluated clinically. Videofluoroscopic Swallow Study is required to rule-out silent aspiration.)    Esophageal Phase of Swallow: No complaints consistent with possible esophageal involvement      GOALS  Goal #1 The patient will tolerate easy to chew consistency and mildly thick liquids without overt signs or symptoms of aspiration with 100 % accuracy over 2 session(s).  In Progress   Goal #2 The patient/family/caregiver will demonstrate understanding and implementation of aspiration precautions and swallow strategies independently over 3 session(s).    In Progress   Goal #3 The patient will tolerate trial upgrade of regular (if dentures present) consistency and thin liquids without overt signs or symptoms of aspiration with 90 % accuracy over 3 session(s).  In Progress   Goal #4 The patient will utilize compensatory strategies as outlined by  BSSE (clinical evaluation) including Slow rate, Small bites, Small sips, Alternate liquids/solids, No straws, Upright 90 degrees with minimal assistance 100 % of the time across 2 sessions.    In Progress     FOLLOW UP  Treatment Plan/Recommendations: Aspiration precautions  Number of Visits to Meet Established Goals: 3  Follow Up Needed (Documentation Required): Yes  SLP Follow-up Date: 03/21/24    Thank you for your referral.   If you have any questions, please contact KAMERON Pruitt M.S. CCC-SLP  Speech Language Pathologist  Phone Number [phone]

## 2024-03-20 NOTE — PLAN OF CARE
Pt displayed episodes of hypotension overnight. CVC and A-line initiated. Weaning down on pressors at this time currently on levophed. Right groin site needing femstop temporarily due to bleeding, now controlled. PRN ofirmev for pain noted overnight. Residual left sided weakness noted and assessed with virgil RN, pt and wife state this is baseline. Dysphagia noted, NPO at this time. Currently this morning down titrated to 2L NC tolerating well. 40mg lasix given overnight, no episodes of tachypnea noted at this time.     Problem: CARDIOVASCULAR - ADULT  Goal: Maintains optimal cardiac output and hemodynamic stability  Description: INTERVENTIONS:  - Monitor vital signs, rhythm, and trends  - Monitor for bleeding, hypotension and signs of decreased cardiac output  - Evaluate effectiveness of vasoactive medications to optimize hemodynamic stability  - Monitor arterial and/or venous puncture sites for bleeding and/or hematoma  - Assess quality of pulses, skin color and temperature  - Assess for signs of decreased coronary artery perfusion - ex. Angina  - Evaluate fluid balance, assess for edema, trend weights  Outcome: Progressing  Goal: Absence of cardiac arrhythmias or at baseline  Description: INTERVENTIONS:  - Continuous cardiac monitoring, monitor vital signs, obtain 12 lead EKG if indicated  - Evaluate effectiveness of antiarrhythmic and heart rate control medications as ordered  - Initiate emergency measures for life threatening arrhythmias  - Monitor electrolytes and administer replacement therapy as ordered  Outcome: Progressing     Problem: RESPIRATORY - ADULT  Goal: Achieves optimal ventilation and oxygenation  Description: INTERVENTIONS:  - Assess for changes in respiratory status  - Assess for changes in mentation and behavior  - Position to facilitate oxygenation and minimize respiratory effort  - Oxygen supplementation based on oxygen saturation or ABGs  - Provide Smoking Cessation handout, if  applicable  - Encourage broncho-pulmonary hygiene including cough, deep breathe, Incentive Spirometry  - Assess the need for suctioning and perform as needed  - Assess and instruct to report SOB or any respiratory difficulty  - Respiratory Therapy support as indicated  - Manage/alleviate anxiety  - Monitor for signs/symptoms of CO2 retention  Outcome: Progressing     Problem: GENITOURINARY - ADULT  Goal: Absence of urinary retention  Description: INTERVENTIONS:  - Assess patient’s ability to void and empty bladder  - Monitor intake/output and perform bladder scan as needed  - Follow urinary retention protocol/standard of care  - Consider collaborating with pharmacy to review patient's medication profile  - Implement strategies to promote bladder emptying  Outcome: Progressing     Problem: Impaired Swallowing  Goal: Minimize aspiration risk  Description: Interventions:  - Patient should be alert and upright for all feedings (90 degrees preferred)  - Offer food and liquids at a slow rate  - No straws  - Encourage small bites of food and small sips of liquid  - Offer pills one at a time, crush or deliver with applesauce as needed  - Discontinue feeding and notify MD (or speech pathologist) if coughing or persistent throat clearing or wet/gurgly vocal quality is noted  Outcome: Progressing     Problem: HEMATOLOGIC - ADULT  Goal: Free from bleeding injury  Description: (Example usage: patient with low platelets)  INTERVENTIONS:  - Avoid intramuscular injections, enemas and rectal medication administration  - Ensure safe mobilization of patient  - Hold pressure on venipuncture sites to achieve adequate hemostasis  - Assess for signs and symptoms of internal bleeding  - Monitor lab trends  - Patient is to report abnormal signs of bleeding to staff  - Avoid use of toothpicks and dental floss  - Use electric shaver for shaving  - Use soft bristle tooth brush  - Limit straining and forceful nose blowing  Outcome:  Progressing     Problem: NEUROLOGICAL - ADULT  Goal: Achieves stable or improved neurological status  Description: INTERVENTIONS  - Assess for and report changes in neurological status  - Initiate measures to prevent increased intracranial pressure  - Maintain blood pressure and fluid volume within ordered parameters to optimize cerebral perfusion and minimize risk of hemorrhage  - Monitor temperature, glucose, and sodium. Initiate appropriate interventions as ordered  Outcome: Progressing

## 2024-03-20 NOTE — PROGRESS NOTES
Patient was noted to be tachypneic after the procedure.  On examination patient has some tachypnea and JVD and crackles.  Chest x-ray demonstrating pulmonary vascular congestion.  Chest x-ray image personally reviewed.  We will give furosemide 20 mg IV x 1 and then repeat as needed.  We will start dual antiplatelet therapy hold off on other heart failure medications while the patient is on Levophed.  Patient had a small hematoma of the  right groin due to some mild oozing.  On manual pressure this improved.  FemoStop to be applied at low pressure to prevent reaccumulation.  Discussed with nurse.

## 2024-03-20 NOTE — DIETARY NOTE
Deferred Cardiac Education Note    Consult received for diet education per protocol. Education deferred until pt transferred out of CCU or until s/p intervention and when appropriate for teaching.       Klarissa Parham RD, LDN  Clinical Dietitian  P: [phone]

## 2024-03-20 NOTE — PROGRESS NOTES
Patient received from cath lab on levophed. Patient is awake and alert. He was noted to have slurred speech and a left sided facial droop. This RN and a second RN conducted a neuro exam together. Wife was at bedside and stated this is the patient's baseline since he had two strokes in past. RN dysphagia screening was done and patient is to be NPO until cleared by SLP.   Patient was also noted to be tachycardic, tachypneic, O2 saturations were 88-90%, and had emesis x2. JVD was also noted. CCU APN was notified about these findings. (See orders for details). Lasix per cardiology was ordered.   1845: Patient's right groin site was noted to be oozing and hematoma was forming. MD brought to the bedside for eval. Manual pressure and fem stop were used to stop bleeding. After 15 minutes the site was no longer bleeding and the forming hematoma felt smaller. A new dressing was placed.

## 2024-03-20 NOTE — PROGRESS NOTES
[facility]  Progress Note     Gurdeep Sagastume  : 1945    Status: Inpatient  Day #: 1    Attending: Royer Null MD  PCP: No primary care provider on file.     Assessment and Plan:    CAD  H/o CABG and AVR  Post-op Hypotension with pulmonary edema  -s/p complex intervention to chronic total occlusion of LAD via saphenous vein graft  -still on levophed, wean as able  -cont asa, brilinta, coreg, losartan  -lasix IV given    Bleeding from cath site  -stopped    Ischemic cardiomyopathy  Acute systolic CHF  Acute on chronic hypoxic respiratory failure  -s/p lasix IV  -monitor    Other:  H/o HTN - BP low now and on pressors  BPH  CKD stage 3       DVT Mechanical Prophylaxis:   SCDs,    DVT Pharmacologic Prophylaxis   Medication   None         DVT Pharmacologic prophylaxis: Aspirin 81 mg     Subjective:      Initial Chief Complaint:  low BP post angiogram    No CP, no SOB. Feels tired.     10 point ROS completed and was negative, except for pertinent positive and negatives stated in subjective.      Objective:      Temp:  [96 °F (35.6 °C)-98.5 °F (36.9 °C)] 98.5 °F (36.9 °C)  Pulse:  [] 77  Resp:  [16-38] 22  BP: ()/(42-82) 102/55  SpO2:  [87 %-100 %] 97 %  AO: ()/(42-64) 118/51  General:  Alert, no distress  HEENT:  Normocephalic, atraumatic  Cardiac:  Regular rate, regular rhythm  Pulmonary:  Clear to auscultation bilaterally, respirations unlabored  Gastrointestinal:  Soft, non-tender, normal bowel sounds  Musculoskeletal:  No joint swelling  Extremities:  No edema, no cyanosis, no clubbing  Neurologic:  nonfocal  Psychiatric:  Normal affect, calm and appropriate    Intake/Output Summary (Last 24 hours) at 3/20/2024 1522  Last data filed at 3/20/2024 1400  Gross per 24 hour   Intake 623.29 ml   Output 1225 ml   Net -601.71 ml         Recent Labs   Lab 24  2121 24  0449   WBC 18.5* 15.8*   HGB 14.0 12.6*   HCT 44.1 39.6   .0 280.0   RBC 4.70 4.28   MCV 93.8 92.5    MCH 29.8 29.4   MCHC 31.7 31.8   RDW 18.9* 18.7*   NEPRELIM 16.23* 13.83*     Recent Labs   Lab 03/19/24  1908 03/20/24  0449   BUN 12 23   CREATSERUM 1.20 1.35*   CA 9.3 9.5    136   K 4.3 4.8    105   CO2 18.0* 21.0   * 129*       XR CHEST AP PORTABLE  (CPT=71045)    Result Date: 3/20/2024  CONCLUSION:   Right upper extremity central venous catheter with the tip projecting over the expected location of the right atrium.  Consider retraction.  Unchanged patchy bilateral airspace opacities.  Small left pleural effusion.  A preliminary report was issued by the [facility] teleradiology service. There are no clinically actionable discrepancies.    Dictated by (CST): Radha Briggs MD on 3/20/2024 at 8:28 AM     Finalized by (CST): Radha Briggs MD on 3/20/2024 at 8:30 AM          XR CHEST AP PORTABLE  (CPT=71045)    Result Date: 3/19/2024  CONCLUSION: Prominent bronchovascular markings, nonspecific, may indicate scarring/COPD changes or pulmonary edema.  More focal opacities in the right upper lung may indicate infection.     elm-remote   Dictated by (CST): Hussain Guevara MD on 3/19/2024 at 7:48 PM     Finalized by (CST): Hussain Guevara MD on 3/19/2024 at 7:52 PM         EKG 12 Lead    Result Date: 3/20/2024  Sinus rhythm with Premature atrial complexes with Aberrant conduction Anterolateral infarct (cited on or before 19-MAR-2024) Abnormal ECG When compared with ECG of 19-MAR-2024 18:34, Aberrant conduction is now Present Serial changes of evolving Anterior infarct Present Serial changes of evolving Anterolateral infarct Present    EKG 12 Lead    Result Date: 3/20/2024  Sinus tachycardia Anterolateral infarct , age undetermined Abnormal ECG No previous ECGs found in Muse Confirmed by Mike Harris ([id number]) on 3/20/2024 8:10:28 AM   Medications:   nitroGLYCERIN in dextrose 5%        aspirin  81 mg Oral Daily    carvedilol  3.125 mg Oral BID with meals    losartan  12.5 mg Oral Daily     ticagrelor  90 mg Oral BID    pantoprazole  40 mg Oral QAM AC      PRN Meds: lidocaine, ipratropium-albuterol, nitroGLYCERIN in dextrose 5%, ondansetron, acetaminophen    Supplementary Documentation:        MDM High. Time spent on chart/note review, review of labs/imaging, discussion with patient, physical exam, discussion with staff, consultants, coordinating care, writing progress note, discussion of plan of care.      Royer Null MD

## 2024-03-20 NOTE — PROCEDURES
[facility]    PATIENT'S NAME: NINA DOOLEY   ATTENDING PHYSICIAN: Eladio Glass DO   OPERATING PHYSICIAN: Eladio Glass DO   PATIENT ACCOUNT#:   [id number]    LOCATION:  [address] [place] [address]  MEDICAL RECORD #:   [id number]       YOB: 1945  ADMISSION DATE:       03/19/2024      OPERATION DATE:  03/19/2024    CARDIAC PROCEDURE TRANSCRIPTION      PERCUTANEOUS CORONARY INTERVENTION  PREOPERATIVE DIAGNOSIS:    POSTOPERATIVE DIAGNOSIS:    PROCEDURE PERFORMED:  Chronic total occlusion revascularization of the left anterior descending artery, intravascular ultrasound guidance use of the left anterior descending, stenting of the left main coronary artery,  intravascular ultrasound of the left main coronary artery, stenting of the first marginal branch of the circumflex coronary artery, intravascular ultrasound of the circumflex and obtuse marginal branch.  Surgeons: Eladio Glass DO, america johnson md   SEDATION:  Conscious sedation was performed by an independent nurse monitoring the patient's blood pressure, oximetry, and heart rate for 2 hours.     DESCRIPTION OF PROCEDURE:  Inform consent was obtained.  Patient was placed in the cardiac catheterization lab.  Right and left groin were sterilely draped and prepped.  Ultrasound guidance was used to obtain access into the right and left common femoral arteries.  On the right side, a 7-St Helenian sheath was advanced.  On the left side, a 6-St Helenian sheath was advanced.  Heparin anticoagulation was given and therapeutic ACT was achieved.  From the 6-St Helenian, an AL 0.75 guide catheter was used to engage the saphenous vein graft to the left anterior descending artery.  At this point, a Corsair catheter along with a  200 wire was used to cross the chronic occlusion of the saphenous vein graft to the left anterior descending artery.  This was taken in a retrograde fashion into the left anterior descending artery.  Multiple wires were then used in attempts to  cross the total occlusion in a retrograde fashion.  We then also came back with a 7-Albanian EBU guide catheter in the antegrade fashion.  We used an antegrade Corsair as well. Multiple wires were then used in order to connect it to subintimal spaces in the retrograde and antegrade plane but multiple attempts were unsuccessful.  We had a lot of difficulty because of an ambiguous cap of the left anterior descending artery at the diagonal branch and the septal branch.  Finally, a wire was taken into the subintimal space antegrade.  Balloon dilation was performed, and we used a Guidezilla in attempts to capture the wire, but we were unsuccessful.  Finally after multiple attempts, an antegrade wire was advanced distally, exchanged for a soft wire.  Intravascular ultrasound was performed.  At this point, we noticed patient had sudden hemodynamic compromise.  We did stat bedside echo which did not demonstrate any pericardiac effusion.  We did notice that there was a left main dissection probably from the retrograde wiring attempts.  Intravascular ultrasound of the left main was performed.  Stenting was also performed of the left main using intravascular guidance along with stenting of the distal and proximal left anterior descending artery.  There was also compromised of the first marginal branch with 90% stenosis noted.  This was also stented and intravascular ultrasound was performed of the circumflex.  Patient received epinephrine 1 mg x1 and then started on Levophed drip and stabilization of blood pressure and heart rate.  Patient remained stable after this treatment, and there was no dissection or perforations noted, and the procedure was terminated.  We deployed a 2.5 mm x 48 mm drug-eluting stent into the mid to distal left anterior descending artery.  We deployed a 3.5 mm x 48 mm drug-eluting stent into the proximal left anterior descending artery going into the left main.  We then postdilated the left main with a  4.5 mm noncompliant balloon.  For the first marginal branch, we deployed a 2.5 mm x 24 mm drug-eluting stent.  All lesions were reduced to 0%.  MEY III flow was achieved in the LAD from MEY 0 flow and MEY III flow was maintained in the left main as well as the circumflex coronary arteries.      IMPRESSION:    1.   Chronic total occlusion revascularization of the left anterior descending artery.  2.   Stenting of the left main and first marginal branch of the circumflex coronary arteries.    RECOMMENDATIONS:  Continuation of dual antiplatelet therapy.  Monitoring in the ICU overnight.    Dictated By Eladio Glass DO  d: 03/19/2024 19:30:18  t: 03/19/2024 21:01:59  [facility] [id number]  AS/

## 2024-03-20 NOTE — PROCEDURES
On call Nocturnist 03/20/24    Called by RN, patient hypotensive in need of higher dose pressors with poor IV access  Consents were obtained timeout was done right subclavian area was prepped.  Cannulated first attempt, guidewire passed, dilated, triple-lumen catheter passed over guidewire, 3 ports flushed anchored to site Biopatch and Tegaderm applied.    Chest x-ray pending.

## 2024-03-20 NOTE — PROGRESS NOTES
Seen in follow-up.  Evaluated in the morning and then again in the evening.  Remains on Levophed drip.  Denies any chest pain or shortness of breath.  Discussed with nursing at bedside.  Critical care time over 30 minutes.  No bleeding overnight.  Groins have been soft.    Vitals:    24 0700   BP: 104/67   Pulse: 87   Resp: 22   Temp:        Intake/Output Summary (Last 24 hours) at 3/20/2024 0805  Last data filed at 3/20/2024 0600  Gross per 24 hour   Intake 465.29 ml   Output 925 ml   Net -459.71 ml     Wt Readings from Last 1 Encounters:   24 152 lb 5.4 oz (69.1 kg)        General: No acute distress.  Neck: Jugular venous pulsations not seen.  Lungs: Crackles at bilateral bases.  Heart: Normal rate. No murmurs.  Abdomen: Soft. Non tender  Extremities: No edema.  Right and left groin are soft  Neurological: Alert. No focal deficits.  Psychiatric: Appropriate mood and affect.  Current Facility-Administered Medications   Medication Dose Route Frequency    lidocaine (Xylocaine) 4 % external solution   Topical PRN    aspirin DR tab 81 mg  81 mg Oral Daily    carvedilol (Coreg) tab 3.125 mg  3.125 mg Oral BID with meals    losartan (Cozaar) tab 12.5 mg  12.5 mg Oral Daily    ticagrelor (Brilinta) tab 90 mg  90 mg Oral BID    pantoprazole (Protonix) DR tab 40 mg  40 mg Oral QAM AC    ondansetron (Zofran) 4 MG/2ML injection 4 mg  4 mg Intravenous Q6H PRN    norepinephrine (Levophed) 4 mg/250mL infusion premix  0.5-30 mcg/min Intravenous Continuous    acetaminophen (Ofirmev) 10 mg/mL infusion premix 1,000 mg  1,000 mg Intravenous Q6H PRN     Medications Prior to Admission   Medication Sig    lansoprazole 30 MG Oral Capsule Delayed Release Take 1 capsule (30 mg total) by mouth every morning before breakfast.    [] aspirin 81 MG Oral Tab EC Take 1 tablet (81 mg total) by mouth daily.    ticagrelor 90 MG Oral Tab Take 1 tablet (90 mg total) by mouth.    carvedilol 3.125 MG Oral Tab Take 1 tablet  (3.125 mg total) by mouth 2 (two) times daily with meals.    LORazepam 0.5 MG Oral Tab Take 1 tablet (0.5 mg total) by mouth nightly.    [] losartan 25 MG Oral Tab Take 0.5 tablets (12.5 mg total) by mouth daily.    mirtazapine 30 MG Oral Tab Take 1 tablet (30 mg total) by mouth nightly.    QUEtiapine 50 MG Oral Tab Take 1 tablet (50 mg total) by mouth nightly.    tamsulosin 0.4 MG Oral Cap Take 1 capsule (0.4 mg total) by mouth nightly.    traZODone 100 MG Oral Tab Take 1 tablet (100 mg total) by mouth nightly.    aspirin 81 MG Oral Tab EC Take 1 tablet (81 mg total) by mouth daily.    nitroglycerin 0.4 MG Sublingual SL Tab Place 1 tablet (0.4 mg total) under the tongue.     XR CHEST AP PORTABLE  (CPT=71045)    Result Date: 3/19/2024  CONCLUSION: Prominent bronchovascular markings, nonspecific, may indicate scarring/COPD changes or pulmonary edema.  More focal opacities in the right upper lung may indicate infection.     elm-remote   Dictated by (CST): Hussain Guevara MD on 3/19/2024 at 7:48 PM     Finalized by (CST): Hussain Guevara MD on 3/19/2024 at 7:52 PM            Lab Results   Component Value Date    WBC 15.8 2024    HGB 12.6 2024    HCT 39.6 2024    .0 2024    CREATSERUM 1.35 2024    BUN 23 2024     2024    K 4.8 2024     2024    CO2 21.0 2024     2024    CA 9.5 2024       IMPRESSION: CAD with LAD and SVG to LAD .  Status post chronic total occlusion revascularization of the left into descending artery.  Stenting of the left main and the first marginal branch of the circumflex coronary arteries.  Acute on chronic systolic heart failure   Cardiogenic shock requiring pressor support  CVA by remote history  Paroxysmal afib, post op no recurrence.  No need to resume Xarelto.  Xarelto was only during the setting of non-ST elevation microinfarction and thrombosis within the saphenous vein graft.      PLAN:       Wean Levophed as blood pressure tolerates.  There was mild rising creatinine.  Urine output has been marginal.  Will continue to monitor this.  Resumed antiplatelets.  Follow-up on echocardiogram.    CVP is low will try IV fluids.

## 2024-03-20 NOTE — PLAN OF CARE
CVP monitored, 250 bolus for hypotension, started maintenance fluids, speech eval, mildly thickened liquids, echo ordered, will relocate to room 204, family updated       Problem: CARDIOVASCULAR - ADULT  Goal: Maintains optimal cardiac output and hemodynamic stability  Description: INTERVENTIONS:  - Monitor vital signs, rhythm, and trends  - Monitor for bleeding, hypotension and signs of decreased cardiac output  - Evaluate effectiveness of vasoactive medications to optimize hemodynamic stability  - Monitor arterial and/or venous puncture sites for bleeding and/or hematoma  - Assess quality of pulses, skin color and temperature  - Assess for signs of decreased coronary artery perfusion - ex. Angina  - Evaluate fluid balance, assess for edema, trend weights  Outcome: Progressing     Problem: Impaired Swallowing  Goal: Minimize aspiration risk  Description: Interventions:  - Patient should be alert and upright for all feedings (90 degrees preferred)  - Offer food and liquids at a slow rate  - No straws  - Encourage small bites of food and small sips of liquid  - Offer pills one at a time, crush or deliver with applesauce as needed  - Discontinue feeding and notify MD (or speech pathologist) if coughing or persistent throat clearing or wet/gurgly vocal quality is noted  Outcome: Progressing

## 2024-03-21 ENCOUNTER — APPOINTMENT (OUTPATIENT)
Dept: GENERAL RADIOLOGY | Facility: HOSPITAL | Age: 79
End: 2024-03-21
Attending: INTERNAL MEDICINE
Payer: COMMERCIAL

## 2024-03-21 LAB
ANION GAP SERPL CALC-SCNC: 7 MMOL/L (ref 0–18)
BASOPHILS # BLD AUTO: 0.04 X10(3) UL (ref 0–0.2)
BASOPHILS NFR BLD AUTO: 0.5 %
BUN BLD-MCNC: 23 MG/DL (ref 9–23)
BUN/CREAT SERPL: 18.9 (ref 10–20)
CALCIUM BLD-MCNC: 8.3 MG/DL (ref 8.7–10.4)
CHLORIDE SERPL-SCNC: 109 MMOL/L (ref 98–112)
CO2 SERPL-SCNC: 23 MMOL/L (ref 21–32)
CREAT BLD-MCNC: 1.22 MG/DL
DEPRECATED RDW RBC AUTO: 62.1 FL (ref 35.1–46.3)
EGFRCR SERPLBLD CKD-EPI 2021: 61 ML/MIN/1.73M2 (ref 60–?)
EOSINOPHIL # BLD AUTO: 0.69 X10(3) UL (ref 0–0.7)
EOSINOPHIL NFR BLD AUTO: 8.5 %
ERYTHROCYTE [DISTWIDTH] IN BLOOD BY AUTOMATED COUNT: 18.6 % (ref 11–15)
GLUCOSE BLD-MCNC: 86 MG/DL (ref 70–99)
HCT VFR BLD AUTO: 27.5 %
HGB BLD-MCNC: 9 G/DL
IMM GRANULOCYTES # BLD AUTO: 0.04 X10(3) UL (ref 0–1)
IMM GRANULOCYTES NFR BLD: 0.5 %
LYMPHOCYTES # BLD AUTO: 1.66 X10(3) UL (ref 1–4)
LYMPHOCYTES NFR BLD AUTO: 20.4 %
MCH RBC QN AUTO: 29.7 PG (ref 26–34)
MCHC RBC AUTO-ENTMCNC: 32.7 G/DL (ref 31–37)
MCV RBC AUTO: 90.8 FL
MONOCYTES # BLD AUTO: 0.56 X10(3) UL (ref 0.1–1)
MONOCYTES NFR BLD AUTO: 6.9 %
NEUTROPHILS # BLD AUTO: 5.13 X10 (3) UL (ref 1.5–7.7)
NEUTROPHILS # BLD AUTO: 5.13 X10(3) UL (ref 1.5–7.7)
NEUTROPHILS NFR BLD AUTO: 63.2 %
OSMOLALITY SERPL CALC.SUM OF ELEC: 291 MOSM/KG (ref 275–295)
PLATELET # BLD AUTO: 171 10(3)UL (ref 150–450)
POTASSIUM SERPL-SCNC: 3.9 MMOL/L (ref 3.5–5.1)
RBC # BLD AUTO: 3.03 X10(6)UL
SODIUM SERPL-SCNC: 139 MMOL/L (ref 136–145)
WBC # BLD AUTO: 8.1 X10(3) UL (ref 4–11)

## 2024-03-21 PROCEDURE — 99233 SBSQ HOSP IP/OBS HIGH 50: CPT | Performed by: HOSPITALIST

## 2024-03-21 PROCEDURE — 71045 X-RAY EXAM CHEST 1 VIEW: CPT | Performed by: INTERNAL MEDICINE

## 2024-03-21 PROCEDURE — 99233 SBSQ HOSP IP/OBS HIGH 50: CPT | Performed by: INTERNAL MEDICINE

## 2024-03-21 RX ORDER — SODIUM CHLORIDE 9 MG/ML
INJECTION, SOLUTION INTRAVENOUS CONTINUOUS
Status: DISCONTINUED | OUTPATIENT
Start: 2024-03-21 | End: 2024-03-22

## 2024-03-21 RX ORDER — LORAZEPAM 0.5 MG/1
0.25 TABLET ORAL NIGHTLY
Status: DISCONTINUED | OUTPATIENT
Start: 2024-03-21 | End: 2024-03-22

## 2024-03-21 RX ORDER — LORAZEPAM 0.5 MG/1
0.25 TABLET ORAL NIGHTLY PRN
Status: DISCONTINUED | OUTPATIENT
Start: 2024-03-21 | End: 2024-03-21

## 2024-03-21 RX ORDER — HEPARIN SODIUM 5000 [USP'U]/ML
5000 INJECTION, SOLUTION INTRAVENOUS; SUBCUTANEOUS EVERY 12 HOURS SCHEDULED
Status: DISCONTINUED | OUTPATIENT
Start: 2024-03-21 | End: 2024-03-25

## 2024-03-21 RX ADMIN — LORAZEPAM 0.25 MG: 0.5 TABLET ORAL at 20:37:00

## 2024-03-21 RX ADMIN — ACETAMINOPHEN 1000 MG: 10 INJECTION, SOLUTION INTRAVENOUS at 17:37:00

## 2024-03-21 RX ADMIN — ACETAMINOPHEN 1000 MG: 10 INJECTION, SOLUTION INTRAVENOUS at 10:08:00

## 2024-03-21 RX ADMIN — HEPARIN SODIUM 5000 UNITS: 5000 INJECTION, SOLUTION INTRAVENOUS; SUBCUTANEOUS at 20:37:00

## 2024-03-21 RX ADMIN — SODIUM CHLORIDE: 9 INJECTION, SOLUTION INTRAVENOUS at 06:11:00

## 2024-03-21 RX ADMIN — SODIUM CHLORIDE: 9 INJECTION, SOLUTION INTRAVENOUS at 18:07:00

## 2024-03-21 RX ADMIN — PANTOPRAZOLE SODIUM 40 MG: 40 TABLET, DELAYED RELEASE ORAL at 06:09:00

## 2024-03-21 RX ADMIN — ASPIRIN 81 MG: 81 TABLET ORAL at 10:03:00

## 2024-03-21 NOTE — PAYOR COMM NOTE
--------------  ADMISSION REVIEW     Payor: ZAY LAMBERT  Subscriber #:  [id number]  Authorization Number: [id number]    Admit date: 3/19/24  Admit time:  5:53 PM       HISTORY AND PHYSICAL EXAMINATION     CHIEF COMPLAINT:  Post complex chronic total occlusion LAD PCI.     HISTORY OF PRESENT ILLNESS:  The patient is a 78-year-old  male who was recently hospitalized at the [facility] with non-ST-elevation myocardial infarction.  He is known to have underlying ischemic cardiomyopathy.  He was found to have left ventricular apical thrombus, ejection fraction 35%, with severe mitral regurgitation.  He had an attempted intervention at that time.  Cardiac angiogram found chronic total occlusion of LAD graft and total LAD occlusion.  Patient was discharged in a stable condition.  He had another angiogram done on February 27 and had percutaneous transluminal angioplasty of the saphenous vein graft to the left anterior descending.  Today, he was brought in for chronic total occlusion LAD intervention.  Post procedure, he had some pulmonary edema, was given 1 dose of IV Lasix.  Also, noted to have a drop in his blood pressure and started on IV pressors and transferred to CCU for further close monitoring.       PAST MEDICAL HISTORY:  Coronary artery disease status post coronary artery bypass graft surgery and reported aortic valve replacement.  He had a history of hypertension, hyperlipidemia.  He has totally occluded LAD.  Recent SVG to LAD intervention; angioplasty; ischemic cardiomyopathy, ejection fraction of 35%; chronic kidney disease stage 3; benign prostate hypertrophy; osteoarthritis; osteoporosis; left ventricular apical thrombus, anticoagulated with Xarelto.  Reported also history of paroxysmal atrial fibrillation and possible underlying chronic obstructive pulmonary disease.     PAST SURGICAL HISTORY:  Coronary artery bypass graft surgery, left femur open reduction and internal fixation.   GENERAL:   Alert and oriented to time, place, and person.  No acute distress.  VITAL SIGNS:  Temperature 97.0; pulse 100; respiratory rate 28; blood pressure 104/72, improved from 76/57; pulse ox 100% on nasal cannula oxygen.  HEENT:  Atraumatic.  Oropharynx clear.  Dry mucous membranes.   NECK:  Supple.  No lymphadenopathy.  Mild jugular venous distention.  LUNGS:  Clear to auscultation bilaterally.    HEART:  Regular rate, rhythm.  S1 and S2 auscultated.  No murmur.   ABDOMEN:  Soft, nondistended.  No tenderness.  Positive bowel sounds.    EXTREMITIES:  No edema, clubbing, or cyanosis.  Left groin dressing.  NEUROLOGIC:  Motor and sensory intact.     ASSESSMENT:    1.       Coronary artery disease, status post complex intervention to chronic total occlusion of left anterior descending via saphenous vein graft.  Complicated by retrograde dissection to the left main, status post distal left main, LAD, and obtuse marginal stenting.  2.       Hypotension and mild pulmonary edema.  Received 1 dose of IV Lasix.  Currently on IV Levophed.  3.       Ischemic cardiomyopathy.  4.       Hypoxemia.     PLAN:  Patient will be admitted to intensive care unit.  Continue IV pressor.  Monitor hemodynamic status.  Diurese as needed.  Dual antiplatelet therapy and as tolerated beta-blocker, Coreg.  Cardiology and intensive care consult.  Monitor his hemodynamic and respiratory status.  Continue oxygen support.     Critical Care  Date of Service: 3/19/2024  6:19 PM     Patient arrived to PCCU s/p PCI and stenting of  to the LAD, LCX OM1 and left main     Tachypnea s/p angiogram  Increasing o2 requirements  HX HFrEF with last known EF ~35%  HX CAD      Will check stat CXR  -ABGs  -BMP  -BNP     May be volume overloaded, may benefit from diuresis     Lasix 40mg X1 dose ordered per cardiology recommendation            3/20/24  Lab 03/19/24  2121 03/20/24  0449   WBC 18.5* 15.8*   HGB 14.0 12.6*   HCT 44.1 39.6   .0 280.0   RBC 4.70 4.28    MCV 93.8 92.5   MCH 29.8 29.4   MCHC 31.7 31.8   RDW 18.9* 18.7*   NEPRELIM 16.23* 13.83*      Lab 03/19/24  1908 03/20/24  0449   BUN 12 23   CREATSERUM 1.20 1.35*   CA 9.3 9.5    136   K 4.3 4.8    105   CO2 18.0* 21.0   * 129*       XR CHEST AP PORTABLE  (CPT=71045)  Result Date: 3/20/2024  CONCLUSION: Right upper extremity central venous catheter with the tip projecting over the expected location of the right atrium.  Consider retraction.  Unchanged patchy bilateral airspace opacities.  Small left pleural effusion.  A preliminary report was issued by the [facility] teleradiology service. There are no clinically actionable discrepancies.    Dictated by (CST): Radha Briggs MD on 3/20/2024 at 8:28 AM     Finalized by (CST): Radha Briggs MD on 3/20/2024 at 8:30 AM           XR CHEST AP PORTABLE  (CPT=71045)  Result Date: 3/19/2024  CONCLUSION:  Prominent bronchovascular markings, nonspecific, may indicate scarring/COPD changes or pulmonary edema.  More focal opacities in the right upper lung may indicate infection.     elm-remote   Dictated by (CST): Hussain Guevara MD on 3/19/2024 at 7:48 PM     Finalized by (CST): Hussain Guevara MD on 3/19/2024 at 7:52 PM           EKG 12 Lead  Result Date: 3/20/2024  Sinus rhythm with Premature atrial complexes with Aberrant conduction Anterolateral infarct (cited on or before 19-MAR-2024) Abnormal ECG When compared with ECG of 19-MAR-2024 18:34, Aberrant conduction is now Present Serial changes of evolving Anterior infarct Present Serial changes of evolving Anterolateral infarct Present     EKG 12 Lead  Result Date: 3/20/2024  Sinus tachycardia Anterolateral infarct , age undetermined Abnormal ECG No previous ECGs found in [facility] Confirmed by Mike Harris ([id number]) on 3/20/2024 8:10:28 AM     Assessment and Plan:  CAD  H/o CABG and AVR  Post-op Hypotension with pulmonary edema  -s/p complex intervention to chronic total occlusion of LAD via saphenous  vein graft  -still on levophed, wean as able  -cont asa, brilinta, coreg, losartan  -lasix IV given     Bleeding from cath site  -stopped     Ischemic cardiomyopathy  Acute systolic CHF  Acute on chronic hypoxic respiratory failure  -s/p lasix IV  -monitor     Other:  H/o HTN - BP low now and on pressors  BPH  CKD stage 3        DVT Mechanical Prophylaxis:   SCDs      CARDIOLOGY  Remains on Levophed drip. Denies any chest pain or shortness of breath.     IMPRESSION: CAD with LAD and SVG to LAD .  Status post chronic total occlusion revascularization of the left into descending artery.  Stenting of the left main and the first marginal branch of the circumflex coronary arteries.  Acute on chronic systolic heart failure   Cardiogenic shock requiring pressor support  CVA by remote history  Paroxysmal afib, post op no recurrence.  No need to resume Xarelto.  Xarelto was only during the setting of non-ST elevation microinfarction and thrombosis within the saphenous vein graft.        PLAN:      Wean Levophed as blood pressure tolerates.  There was mild rising creatinine.  Urine output has been marginal.  Will continue to monitor this.  Resumed antiplatelets.  Follow-up on echocardiogram.     CVP is low will try IV fluids    MEDICATIONS ADMINISTERED IN LAST 1 DAY:  acetaminophen (Ofirmev) 10 mg/mL infusion premix 1,000 mg       Date Action Dose Route User    3/21/2024 1008 New Bag 1,000 mg Intravenous Estefany Arrieta RN    3/20/2024 2247 New Bag 1,000 mg Intravenous Cedrick Jones RN          aspirin DR tab 81 mg       Date Action Dose Route User    3/21/2024 1003 Given 81 mg Oral Estefany Arrieta RN          LORazepam (Ativan) tab 0.5 mg       Date Action Dose Route User    3/20/2024 2352 Given 0.5 mg Oral Cedrick Jones RN          norepinephrine (Levophed) 4 mg/250mL infusion premix       Date Action Dose Route User    3/21/2024 0930 Rate/Dose Change 1 mcg/min Intravenous Estefany Arrieta RN     3/21/2024 0846 Rate/Dose Change 2 mcg/min Intravenous Estefany Arrieta RN    3/20/2024 2244 New Bag 3 mcg/min Intravenous Cedrick Jones RN    3/20/2024 2200 Rate/Dose Change 3 mcg/min Intravenous Cedrick Jones RN    3/20/2024 1942 Rate/Dose Change 3 mcg/min Intravenous Joselo Penny RN    3/20/2024 1824 Rate/Dose Change 4 mcg/min Intravenous Joselo Penny RN    3/20/2024 1758 Rate/Dose Change 5 mcg/min Intravenous Joselo Penny RN    3/20/2024 1100 Rate/Dose Change 6 mcg/min Intravenous Joselo Penny RN          pantoprazole (Protonix) DR tab 40 mg       Date Action Dose Route User    3/21/2024 0609 Given 40 mg Oral Cedrick Jones RN          Perflutren Lipid Microsphere (DEFINITY) 6.52 MG/ML injection 1.5 mL       Date Action Dose Route User    3/20/2024 1953 Given 1.5 mL Intravenous Leticia, Scot          sodium chloride 0.9% infusion       Date Action Dose Route User    3/20/2024 1823 New Bag (none) Intravenous Joselo Penny RN          sodium chloride 0.9% infusion       Date Action Dose Route User    3/20/2024 2243 New Bag (none) Intravenous Cedrick Jones RN    3/20/2024 1900 New Bag (none) Intravenous Cedrick Jones RN          sodium chloride 0.9% infusion       Date Action Dose Route User    3/21/2024 0611 New Bag (none) Intravenous Cedrick Jones, RN          sodium chloride 0.9 % IV bolus 250 mL       Date Action Dose Route User    3/20/2024 1757 New Bag 250 mL Intravenous Joselo Penny RN          sodium chloride 0.9 % IV bolus 250 mL       Date Action Dose Route User    3/20/2024 1941 New Bag 250 mL Intravenous Joselo Penny RN          sodium chloride 0.9 % IV bolus 250 mL       Date Action Dose Route User    3/21/2024 0946 New Bag 250 mL Intravenous Estefany Arrieta RN          ticagrelor (Brilinta) tab 90 mg       Date Action Dose Route User    3/21/2024 1003 Given 90 mg Oral Estefany Arrieta RN    3/20/2024 2058 Given 90 mg Oral Robert  MARY Philip            Vitals (last day)       Date/Time Temp Pulse Resp BP SpO2 Weight O2 Device O2 Flow Rate (L/min) [facility]    03/21/24 1000 -- 74 22 94/60 97 % -- None (Room air) --     03/21/24 0900 -- 76 25 102/52 92 % -- None (Room air) --     03/21/24 0800 98.4 °F (36.9 °C) 70 19 108/53 93 % -- None (Room air) --     03/21/24 0600 -- 75 23 103/61 93 % -- Nasal cannula 2 L/min     03/21/24 0500 -- 65 22 106/51 94 % -- Nasal cannula 2 L/min     03/21/24 0400 96.8 °F (36 °C) 71 22 100/53 97 % 158 lb 11.7 oz Nasal cannula 2 L/min     03/21/24 0300 -- 70 20 100/53 91 % -- -- --     03/21/24 0200 -- 71 22 93/55 95 % -- Nasal cannula 2 L/min     03/21/24 0100 -- 77 24 111/54 94 % -- -- --     03/21/24 0000 -- 71 27 105/60 97 % -- -- --     03/21/24 0000 97.2 °F (36.2 °C) -- -- -- -- -- -- --     03/20/24 2300 -- 65 23 100/56 97 % -- -- --     03/20/24 2300 -- -- -- -- -- -- Nasal cannula 2 L/min     03/20/24 2200 -- 72 24 101/55 98 % -- Nasal cannula 2 L/min     03/20/24 2100 -- 67 18 103/68 100 % -- -- --     03/20/24 2036 -- 65 31 -- 100 % -- -- --     03/20/24 2000 97.4 °F (36.3 °C) 72 30 101/60 100 % -- -- -- AA    03/20/24 1940 -- 79 32 -- 96 % -- -- --     03/20/24 1930 -- 72 23 -- 97 % -- -- -- DG    03/20/24 1915 -- 68 28 -- 97 % -- -- -- DG    03/20/24 1900 -- 76 28 115/75 99 % -- -- -- DG    03/20/24 1845 -- 75 27 -- 98 % -- -- -- DG    03/20/24 1830 -- 74 19 -- 99 % -- -- -- DG    03/20/24 1815 -- 78 22 -- 97 % -- -- -- DG    03/20/24 1800 -- 77 23 114/70 97 % -- -- -- DG    03/20/24 1745 -- 75 29 -- 96 % -- -- -- DG    03/20/24 1730 -- 77 30 -- 98 % -- -- -- DG    03/20/24 1715 -- 78 19 -- 97 % -- -- -- DG    03/20/24 1700 -- 78 21 105/61 95 % -- -- -- DG    03/20/24 1630 -- 79 25 96/64 94 % -- -- -- DG    03/20/24 1600 98.1 °F (36.7 °C) 78 30 116/59 92 % -- -- -- DG    03/20/24 1530 -- 85 15 -- 93 % -- -- -- DG    03/20/24 1500 -- 82 21 118/66 93 % -- -- -- DG    03/20/24  1430 -- 81 17 -- 95 % -- -- -- DG    03/20/24 1400 -- 84 17 108/70 98 % -- -- -- DG    03/20/24 1330 -- 77 21 -- 94 % -- -- -- DG    03/20/24 1300 -- 80 16 107/55 97 % -- -- -- DG    03/20/24 1230 -- 73 15 -- 96 % -- -- -- DG    03/20/24 1200 97.5 °F (36.4 °C) 73 24 113/65 98 % -- -- -- DG    03/20/24 1130 -- 77 22 -- 97 % -- -- -- DG    03/20/24 1127 -- 82 27 -- 96 % -- -- -- DG    03/20/24 1100 -- 81 16 102/55 97 % -- -- -- DG    03/20/24 1030 -- 82 17 -- 94 % -- -- -- DG    03/20/24 1015 -- 87 17 -- 96 % -- -- -- DG    03/20/24 0945 -- 84 18 -- 95 % -- -- -- DG    03/20/24 0930 -- 84 18 -- 92 % -- -- -- DG    03/20/24 0915 -- 86 20 -- 96 % -- -- -- DG    03/20/24 0845 -- 88 24 -- 94 % -- -- -- DG    03/20/24 0830 -- 84 19 -- 95 % -- -- -- DG    03/20/24 0800 98.5 °F (36.9 °C) -- -- -- -- -- -- -- DG    03/20/24 0700 -- 87 22 104/67 99 % -- High flow nasal cannula 5 L/min MT    03/20/24 0600 -- 92 22 102/68 99 % 152 lb 5.4 oz High flow nasal cannula 5 L/min MT    03/20/24 0500 -- 96 25 103/67 96 % -- High flow nasal cannula 5 L/min MT    03/20/24 0400 98 °F (36.7 °C) 103 22 115/79 98 % -- High flow nasal cannula 5 L/min MT    03/20/24 0300 -- 110 16 94/68 98 % -- High flow nasal cannula 5 L/min MT    03/20/24 0230 -- 98 25 -- 95 % -- -- -- MT    03/20/24 0200 -- 107 26 101/71 99 % -- High flow nasal cannula 5 L/min MT    03/20/24 0130 -- 109 29 90/62 96 % -- -- -- MT    03/20/24 0100 -- 107 26 90/62 97 % -- High flow nasal cannula 5 L/min MT    03/20/24 0000 98.5 °F (36.9 °C) 101 23 93/71 97 % -- High flow nasal cannula 5 L/min MT          CIWA Scores (since admission)       None

## 2024-03-21 NOTE — PROGRESS NOTES
Discussed with overnight nurse.  Patient was fluid responsive.  Patient received 2 boluses of saline at 250 mL with increase in blood pressure.  He was then placed on 100 mL an hour overnight.  Oxygenation remained well in the 90s on 2 L and when he would take off his oxygen still remain in the 90s.  He has not had any respiratory distress.  We will now decrease saline to 50 mL an hour.  His urine output picked up overnight as well prev iously it was 300 mL per the shift overnight it was 700 mL.  Levophed is down to 3 mcg.

## 2024-03-21 NOTE — PLAN OF CARE
CVP monitored, levophed for hypotension, IV fluids discontinued per order, family updated of patients new room and status.  Problem: CARDIOVASCULAR - ADULT  Goal: Maintains optimal cardiac output and hemodynamic stability  Description: INTERVENTIONS:  - Monitor vital signs, rhythm, and trends  - Monitor for bleeding, hypotension and signs of decreased cardiac output  - Evaluate effectiveness of vasoactive medications to optimize hemodynamic stability  - Monitor arterial and/or venous puncture sites for bleeding and/or hematoma  - Assess quality of pulses, skin color and temperature  - Assess for signs of decreased coronary artery perfusion - ex. Angina  - Evaluate fluid balance, assess for edema, trend weights  Outcome: Progressing  Goal: Absence of cardiac arrhythmias or at baseline  Description: INTERVENTIONS:  - Continuous cardiac monitoring, monitor vital signs, obtain 12 lead EKG if indicated  - Evaluate effectiveness of antiarrhythmic and heart rate control medications as ordered  - Initiate emergency measures for life threatening arrhythmias  - Monitor electrolytes and administer replacement therapy as ordered  Outcome: Progressing

## 2024-03-21 NOTE — PROGRESS NOTES
[facility]  Progress Note     Gurdeep Sagastume  : 1945    Status: Inpatient  Day #: 2    Attending: Royer Null MD  PCP: No primary care provider on file.     Assessment and Plan:    CAD  H/o CABG and AVR  Post-op Hypotension with pulmonary edema  -s/p complex intervention to chronic total occlusion of LAD via saphenous vein graft  -still on levophed, wean as able - down to 2mcg  -cont asa, brilinta, coreg, losartan  -lasix IV given    Bleeding from cath site  Acute blood loss anemia  -bleeding stopped  -hgb lower today  -monitor hgb    Ischemic cardiomyopathy  Acute systolic CHF  Acute on chronic hypoxic respiratory failure  -s/p lasix IV  -monitor    Other:  H/o HTN - BP low now and on pressors  BPH  CKD stage 3       DVT Mechanical Prophylaxis:   SCDs,    DVT Pharmacologic Prophylaxis   Medication    heparin (Porcine) 5000 UNIT/ML injection 5,000 Units         DVT Pharmacologic prophylaxis: Aspirin 81 mg     Subjective:      Initial Chief Complaint:  low BP post angiogram    No CP, no SOB. No dizziness    10 point ROS completed and was negative, except for pertinent positive and negatives stated in subjective.      Objective:      Temp:  [96.8 °F (36 °C)-98.4 °F (36.9 °C)] 98.4 °F (36.9 °C)  Pulse:  [65-85] 68  Resp:  [15-32] 30  BP: ()/(49-75) 109/67  SpO2:  [91 %-100 %] 95 %  AO: ()/(37-58) 85/37  General:  Alert, no distress  HEENT:  Normocephalic, atraumatic  Cardiac:  Regular rate, regular rhythm  Pulmonary:  Clear to auscultation bilaterally, respirations unlabored  Gastrointestinal:  Soft, non-tender, normal bowel sounds  Musculoskeletal:  No joint swelling  Extremities:  No edema, no cyanosis, no clubbing  Neurologic:  nonfocal  Psychiatric:  Normal affect, calm and appropriate    Intake/Output Summary (Last 24 hours) at 3/21/2024 1331  Last data filed at 3/21/2024 0946  Gross per 24 hour   Intake 1549.4 ml   Output 1000 ml   Net 549.4 ml         Recent Labs   Lab  03/19/24 2121 03/20/24 0449 03/21/24  0547   WBC 18.5* 15.8* 8.1   HGB 14.0 12.6* 9.0*   HCT 44.1 39.6 27.5*   .0 280.0 171.0   RBC 4.70 4.28 3.03*   MCV 93.8 92.5 90.8   MCH 29.8 29.4 29.7   MCHC 31.7 31.8 32.7   RDW 18.9* 18.7* 18.6*   NEPRELIM 16.23* 13.83* 5.13     Recent Labs   Lab 03/19/24 1908 03/20/24 0449 03/21/24  0547   BUN 12 23 23   CREATSERUM 1.20 1.35* 1.22   CA 9.3 9.5 8.3*    136 139   K 4.3 4.8 3.9    105 109   CO2 18.0* 21.0 23.0   * 129* 86       XR CHEST AP PORTABLE  (CPT=71045)    Result Date: 3/21/2024  PROCEDURE: XR CHEST AP PORTABLE  (CPT=71045) TIME: 619  COMPARISON: [facility], XR CHEST AP PORTABLE (CPT=71045), 3/20/2024, 4:41 AM.  INDICATIONS: Follow up for right side central line.  TECHNIQUE:   Single view.   Findings and impression:  Tip of the PICC at the superior cavoatrial junction  Stable patchy opacity at the left base  Normal heart size with coronary stents, AVR; no edema       Dictated by (CST): Micha Caputo MD on 3/21/2024 at 7:54 AM     Finalized by (CST): Micha Caputo MD on 3/21/2024 at 7:55 AM          XR CHEST AP PORTABLE  (CPT=71045)    Result Date: 3/20/2024  CONCLUSION:   Right upper extremity central venous catheter with the tip projecting over the expected location of the right atrium.  Consider retraction.  Unchanged patchy bilateral airspace opacities.  Small left pleural effusion.  A preliminary report was issued by the [facility] Radiology teleradiology service. There are no clinically actionable discrepancies.    Dictated by (CST): Radha Briggs MD on 3/20/2024 at 8:28 AM     Finalized by (CST): Radha Briggs MD on 3/20/2024 at 8:30 AM          XR CHEST AP PORTABLE  (CPT=71045)    Result Date: 3/19/2024  CONCLUSION: Prominent bronchovascular markings, nonspecific, may indicate scarring/COPD changes or pulmonary edema.  More focal opacities in the right upper lung may indicate infection.     [facility]   Dictated by  (CST): Hussain Guevara MD on 3/19/2024 at 7:48 PM     Finalized by (CST): Hussain Guevara MD on 3/19/2024 at 7:52 PM         EKG 12 Lead    Result Date: 3/20/2024  Sinus rhythm with Premature ventricular complexes Anterolateral infarct (cited on or before 19-MAR-2024) Abnormal ECG When compared with ECG of 19-MAR-2024 18:34, No significant change was found Confirmed by benny rothman ([id number]) on 3/20/2024 4:32:57 PM    EKG 12 Lead    Result Date: 3/20/2024  Sinus tachycardia Anterolateral infarct , age undetermined Abnormal ECG No previous ECGs found in [facility] Confirmed by Mike Harris ([id number]) on 3/20/2024 8:10:28 AM   Medications:   heparin  5,000 Units Subcutaneous 2 times per day    LORazepam  0.25 mg Oral Nightly    sodium chloride  250 mL Intravenous Once    aspirin  81 mg Oral Daily    carvedilol  3.125 mg Oral BID with meals    losartan  12.5 mg Oral Daily    ticagrelor  90 mg Oral BID    pantoprazole  40 mg Oral QAM AC      PRN Meds: lidocaine, ipratropium-albuterol, ondansetron, acetaminophen    Supplementary Documentation:        [facility]. Time spent on chart/note review, review of labs/imaging, discussion with patient, physical exam, discussion with staff, consultants, coordinating care, writing progress note, discussion of plan of care.      Royer Null MD

## 2024-03-21 NOTE — SLP NOTE
SPEECH DAILY NOTE - INPATIENT    ASSESSMENT & PLAN   ASSESSMENT    Proper PPE worn. Hands sanitized upon entrance/exit Pt room.       Pt alert, afebrile and on 2L/min 02 via NC. Pt seen for swallow analysis per BSE recommendations (after consulting with RN). Spouse present. Pt agreed to participate. Pt's preferred method of learning verbal. Pt upright in bed; observed with current diet of soft ETC/mildly-thick liquids for monitoring diet tolerance. Additionally, Pt seen with thin liquid trials for candidacy of diet upgrade. Swallowing precautions/strategies discussed; mild cues needed for execution. Functional /mastication/lingual skills on soft ETC consistency. No significant oral retention. Pharyngeal response appeared delayed per hyolaryngeal elevation to completion (functional rise/strength per palpation). No overt CSA on pureed nor mildly-thick liquids (no coughing, no throat clearing, clear vocal quality and no SOB). Delayed cough S/P controlled thin liquids. No diet upgrade. Collaborated with RN regarding Pt's swallowing plan of care. Per RN, Pt with good tolerance of current diet. No report of difficulty taking meds. No new CXR completed. Sp02 ~93% during this session. Call light within Pt's reach upon SLP discharge from room.      CXR 3/21/24:  Findings and impression:  Tip of the PICC at the superior cavoatrial junction    Stable patchy opacity at the left base    Normal heart size with coronary stents, AVR; no edema           PLAN:    To f/u x2 sessions to ensure safe intake of diet and reinforce swallowing/aspiration precautions. To monitor for new CXR results. Diet upgrade as tolerated. VFSS IF appropriate. Family education to be continued as available.        Diet Recommendations - Solids: Soft/ Easy to chew  Diet Recommendations - Liquids: Nectar thick liquids/ Mildly thick    Compensatory Strategies Recommended: No straws;Slow rate;Alternate consistencies;Small bites and sips  Aspiration Precautions:  Upright position;Slow rate;Small bites and sips;No straw  Medication Administration Recommendations: Whole in puree    Patient Experiencing Pain: No  Treatment Plan  Treatment Plan/Recommendations: Aspiration precautions  Interdisciplinary Communication: Discussed with RN    GOALS  Goal #1 The patient will tolerate easy to chew consistency and mildly thick liquids without overt signs or symptoms of aspiration with 100 % accuracy over 2 session(s).    No CSA on current diet.        In Progress   Goal #2 The patient/family/caregiver will demonstrate understanding and implementation of aspiration precautions and swallow strategies independently over 3 session(s).    Swallow precautions/diet discussed with spouse. V/u.     In Progress   Goal #3 The patient will tolerate trial upgrade of regular (if dentures present) consistency and thin liquids without overt signs or symptoms of aspiration with 90 % accuracy over 3 session(s).    No diet upgrade during this session.     In Progress   Goal #4 The patient will utilize compensatory strategies as outlined by  BSSE (clinical evaluation) including Slow rate, Small bites, Small sips, Alternate liquids/solids, No straws, Upright 90 degrees with minimal assistance 100 % of the time across 2 sessions.    Mild cues for small  sips.     In Progress   FOLLOW UP  Follow Up Needed (Documentation Required): Yes  SLP Follow-up Date: 03/22/24  Number of Visits to Meet Established Goals: 3    Session: 1    If you have any questions, please contact   Johnna Bahena M.S. [facility]/SLP  Speech-Language Pathologist  [facility]  #[phone]

## 2024-03-21 NOTE — PLAN OF CARE
Problem: CARDIOVASCULAR - ADULT  Goal: Maintains optimal cardiac output and hemodynamic stability  Description: INTERVENTIONS:  - Monitor vital signs, rhythm, and trends  - Monitor for bleeding, hypotension and signs of decreased cardiac output  - Evaluate effectiveness of vasoactive medications to optimize hemodynamic stability  - Monitor arterial and/or venous puncture sites for bleeding and/or hematoma  - Assess quality of pulses, skin color and temperature  - Assess for signs of decreased coronary artery perfusion - ex. Angina  - Evaluate fluid balance, assess for edema, trend weights  Outcome: Progressing  3/19 Post Chronic total occlusion revascularization of the LAD, CRIS to 1st marginal of L circumflex.  He was given 250 mls x2 of Normal saline to get him off the Levophed drip. His Blood pressure was better at /50-60s. At 1800 noted that his blood pressure dropped to 86/53, was given Normal saline 500ml bolus per Dr. Vega (Cardiologist) when she rounded this morning. He is awake, alert and coherent. He has adequate urine output thru his silvestre. Checked his bilateral femoral area, noted to be purplish, but soft to touch, noted no bleed nor hematoma.  Problem: RESPIRATORY - ADULT  Goal: Achieves optimal ventilation and oxygenation  Description: INTERVENTIONS:  - Assess for changes in respiratory status  - Assess for changes in mentation and behavior  - Position to facilitate oxygenation and minimize respiratory effort  - Oxygen supplementation based on oxygen saturation or ABGs  - Provide Smoking Cessation handout, if applicable  - Encourage broncho-pulmonary hygiene including cough, deep breathe, Incentive Spirometry  - Assess the need for suctioning and perform as needed  - Assess and instruct to report SOB or any respiratory difficulty  - Respiratory Therapy support as indicated  - Manage/alleviate anxiety  - Monitor for signs/symptoms of CO2 retention  Outcome: Progressing   He was breathing regularly  on NC 2L/min and 02 saturation running >96%. At some point he removed his NC and was breathing on room air o2 saturation was >94%. Has no c/o shortness of breath, he says he feels good and was wondering if he would be sent home tomorrow.  Problem: Impaired Swallowing  Goal: Minimize aspiration risk  Description: Interventions:  - Patient should be alert and upright for all feedings (90 degrees preferred)  - Offer food and liquids at a slow rate  - No straws  - Encourage small bites of food and small sips of liquid  - Offer pills one at a time, crush or deliver with applesauce as needed  - Discontinue feeding and notify MD (or speech pathologist) if coughing or persistent throat clearing or wet/gurgly vocal quality is noted  Outcome: Progressing   He is tolerating his diet well.  Problem: HEMATOLOGIC - ADULT  Goal: Free from bleeding injury  Description: (Example usage: patient with low platelets)  INTERVENTIONS:  - Avoid intramuscular injections, enemas and rectal medication administration  - Ensure safe mobilization of patient  - Hold pressure on venipuncture sites to achieve adequate hemostasis  - Assess for signs and symptoms of internal bleeding  - Monitor lab trends  - Patient is to report abnormal signs of bleeding to staff  - Avoid use of toothpicks and dental floss  - Use electric shaver for shaving  - Use soft bristle tooth brush  - Limit straining and forceful nose blowing  Outcome: Progressing   His bilateral femoral area was checked, dressing removed and noted purplish discoloration, but soft on palpation, noted no bleed nor hematoma.

## 2024-03-21 NOTE — PROGRESS NOTES
Has received IVF, total of 1 L with improvement in BP. Levo being weaned off. Echo reviewed. D/w RN and family at bedside. Currently patient is asymptomatic. No cp/sob. Feeling well.    Vitals:    24 0900   BP: 102/52   Pulse: 76   Resp: 25   Temp:        Intake/Output Summary (Last 24 hours) at 3/21/2024 0959  Last data filed at 3/21/2024 0500  Gross per 24 hour   Intake 1349.4 ml   Output 1000 ml   Net 349.4 ml     Wt Readings from Last 1 Encounters:   24 158 lb 11.7 oz (72 kg)        General: No acute distress.  Neck: Jugular venous pulsations not seen.  Lungs: Crackles at bilateral bases.  Heart: Normal rate. No murmurs.  Abdomen: Soft. Non tender  Extremities: No edema.  Right and left groin are soft  Neurological: Alert. No focal deficits.  Psychiatric: Appropriate mood and affect.      Medications Prior to Admission   Medication Sig    aspirin 81 MG Oral Tab EC Take 1 tablet (81 mg total) by mouth daily.    lansoprazole 30 MG Oral Capsule Delayed Release Take 1 capsule (30 mg total) by mouth every morning before breakfast.    [] aspirin 81 MG Oral Tab EC Take 1 tablet (81 mg total) by mouth daily.    [] ticagrelor 90 MG Oral Tab Take 1 tablet (90 mg total) by mouth.    carvedilol 3.125 MG Oral Tab Take 1 tablet (3.125 mg total) by mouth 2 (two) times daily with meals.    LORazepam 0.5 MG Oral Tab Take 1 tablet (0.5 mg total) by mouth nightly.    [] losartan 25 MG Oral Tab Take 0.5 tablets (12.5 mg total) by mouth daily.    mirtazapine 30 MG Oral Tab Take 1 tablet (30 mg total) by mouth nightly.    QUEtiapine 50 MG Oral Tab Take 1 tablet (50 mg total) by mouth nightly.    tamsulosin 0.4 MG Oral Cap Take 1 capsule (0.4 mg total) by mouth nightly.    traZODone 100 MG Oral Tab Take 1 tablet (100 mg total) by mouth nightly.    nitroglycerin 0.4 MG Sublingual SL Tab Place 1 tablet (0.4 mg total) under the tongue.     XR CHEST AP PORTABLE  (CPT=71045)    Result Date:  3/21/2024  PROCEDURE: XR CHEST AP PORTABLE  (CPT=71045) TIME: 619  COMPARISON: [facility], XR CHEST AP PORTABLE (CPT=71045), 3/20/2024, 4:41 AM.  INDICATIONS: Follow up for right side central line.  TECHNIQUE:   Single view.   Findings and impression:  Tip of the PICC at the superior cavoatrial junction  Stable patchy opacity at the left base  Normal heart size with coronary stents, AVR; no edema       Dictated by (CST): Micha Caputo MD on 3/21/2024 at 7:54 AM     Finalized by (CST): Micha Caputo MD on 3/21/2024 at 7:55 AM          XR CHEST AP PORTABLE  (CPT=71045)    Result Date: 3/20/2024  CONCLUSION:   Right upper extremity central venous catheter with the tip projecting over the expected location of the right atrium.  Consider retraction.  Unchanged patchy bilateral airspace opacities.  Small left pleural effusion.  A preliminary report was issued by the [facility] Radiology teleradiology service. There are no clinically actionable discrepancies.    Dictated by (CST): Radha Briggs MD on 3/20/2024 at 8:28 AM     Finalized by (CST): Radha Briggs MD on 3/20/2024 at 8:30 AM          XR CHEST AP PORTABLE  (CPT=71045)    Result Date: 3/19/2024  CONCLUSION: Prominent bronchovascular markings, nonspecific, may indicate scarring/COPD changes or pulmonary edema.  More focal opacities in the right upper lung may indicate infection.     [facility]   Dictated by (CST): Hussain Guevara MD on 3/19/2024 at 7:48 PM     Finalized by (CST): Hussain Guevara MD on 3/19/2024 at 7:52 PM             Lab Results   Component Value Date    WBC 8.1 03/21/2024    HGB 9.0 03/21/2024    HCT 27.5 03/21/2024    .0 03/21/2024    CREATSERUM 1.22 03/21/2024    BUN 23 03/21/2024     03/21/2024    K 3.9 03/21/2024     03/21/2024    CO2 23.0 03/21/2024    GLU 86 03/21/2024    CA 8.3 03/21/2024       IMPRESSION:   CAD with LAD and SVG to LAD .  Status post chronic total occlusion revascularization of the left  into descending artery.  Stenting of the left main and the first marginal branch of the circumflex coronary arteries.  chronic systolic heart failure LVEF most recent echo 30-35% with RWMA in LAD territory  CVA by remote history  Paroxysmal afib, post op no recurrence.  No need to resume Xarelto.  Xarelto was only during the setting of non-ST elevation microinfarction and thrombosis within the saphenous vein graft.      PLAN:      Continue weaning off pressors. If needed, give additional 500 ml bolus to help with wean off  Continue remaining cardiac meds as is.

## 2024-03-22 LAB
ANION GAP SERPL CALC-SCNC: 5 MMOL/L (ref 0–18)
BUN BLD-MCNC: 19 MG/DL (ref 9–23)
BUN/CREAT SERPL: 18.4 (ref 10–20)
CALCIUM BLD-MCNC: 8.3 MG/DL (ref 8.7–10.4)
CHLORIDE SERPL-SCNC: 112 MMOL/L (ref 98–112)
CO2 SERPL-SCNC: 23 MMOL/L (ref 21–32)
CREAT BLD-MCNC: 1.03 MG/DL
DEPRECATED RDW RBC AUTO: 64 FL (ref 35.1–46.3)
EGFRCR SERPLBLD CKD-EPI 2021: 75 ML/MIN/1.73M2 (ref 60–?)
ERYTHROCYTE [DISTWIDTH] IN BLOOD BY AUTOMATED COUNT: 18.6 % (ref 11–15)
GLUCOSE BLD-MCNC: 69 MG/DL (ref 70–99)
HCT VFR BLD AUTO: 26.2 %
HGB BLD-MCNC: 8.3 G/DL
MCH RBC QN AUTO: 29.4 PG (ref 26–34)
MCHC RBC AUTO-ENTMCNC: 31.7 G/DL (ref 31–37)
MCV RBC AUTO: 92.9 FL
OSMOLALITY SERPL CALC.SUM OF ELEC: 291 MOSM/KG (ref 275–295)
PLATELET # BLD AUTO: 122 10(3)UL (ref 150–450)
POTASSIUM SERPL-SCNC: 3.7 MMOL/L (ref 3.5–5.1)
RBC # BLD AUTO: 2.82 X10(6)UL
SODIUM SERPL-SCNC: 140 MMOL/L (ref 136–145)
WBC # BLD AUTO: 5.3 X10(3) UL (ref 4–11)

## 2024-03-22 PROCEDURE — 99233 SBSQ HOSP IP/OBS HIGH 50: CPT | Performed by: HOSPITALIST

## 2024-03-22 PROCEDURE — 99233 SBSQ HOSP IP/OBS HIGH 50: CPT | Performed by: INTERNAL MEDICINE

## 2024-03-22 RX ORDER — MELATONIN
325
Status: DISCONTINUED | OUTPATIENT
Start: 2024-03-22 | End: 2024-03-25

## 2024-03-22 RX ORDER — POTASSIUM CHLORIDE 20 MEQ/1
40 TABLET, EXTENDED RELEASE ORAL ONCE
Status: COMPLETED | OUTPATIENT
Start: 2024-03-22 | End: 2024-03-22

## 2024-03-22 RX ORDER — ACETAMINOPHEN 325 MG/1
650 TABLET ORAL EVERY 6 HOURS PRN
Status: DISCONTINUED | OUTPATIENT
Start: 2024-03-22 | End: 2024-03-25

## 2024-03-22 RX ORDER — LORAZEPAM 0.5 MG/1
0.5 TABLET ORAL NIGHTLY
Status: DISCONTINUED | OUTPATIENT
Start: 2024-03-22 | End: 2024-03-25

## 2024-03-22 RX ADMIN — MELATONIN 325 MG: at 15:20:00

## 2024-03-22 RX ADMIN — SODIUM CHLORIDE: 9 INJECTION, SOLUTION INTRAVENOUS at 05:00:00

## 2024-03-22 RX ADMIN — POTASSIUM CHLORIDE 40 MEQ: 20 TABLET, EXTENDED RELEASE ORAL at 15:20:00

## 2024-03-22 RX ADMIN — ASPIRIN 81 MG: 81 TABLET ORAL at 07:57:00

## 2024-03-22 RX ADMIN — PANTOPRAZOLE SODIUM 40 MG: 40 TABLET, DELAYED RELEASE ORAL at 06:05:00

## 2024-03-22 RX ADMIN — ACETAMINOPHEN 650 MG: 325 TABLET ORAL at 09:51:00

## 2024-03-22 RX ADMIN — HEPARIN SODIUM 5000 UNITS: 5000 INJECTION, SOLUTION INTRAVENOUS; SUBCUTANEOUS at 07:57:00

## 2024-03-22 RX ADMIN — LORAZEPAM 0.5 MG: 0.5 TABLET ORAL at 20:36:00

## 2024-03-22 RX ADMIN — HEPARIN SODIUM 5000 UNITS: 5000 INJECTION, SOLUTION INTRAVENOUS; SUBCUTANEOUS at 20:36:00

## 2024-03-22 NOTE — PLAN OF CARE
Pt is aox3, resting in bed, bed is low and locked in place, call light within reach. Pt BP stable. Groin site C/D/I. Oden removed.  Pt remains on 1 L NC. Per pt he wears 1 L at home. Tx to tele  Problem: CARDIOVASCULAR - ADULT  Goal: Maintains optimal cardiac output and hemodynamic stability  Description: INTERVENTIONS:  - Monitor vital signs, rhythm, and trends  - Monitor for bleeding, hypotension and signs of decreased cardiac output  - Evaluate effectiveness of vasoactive medications to optimize hemodynamic stability  - Monitor arterial and/or venous puncture sites for bleeding and/or hematoma  - Assess quality of pulses, skin color and temperature  - Assess for signs of decreased coronary artery perfusion - ex. Angina  - Evaluate fluid balance, assess for edema, trend weights  Outcome: Progressing  Goal: Absence of cardiac arrhythmias or at baseline  Description: INTERVENTIONS:  - Continuous cardiac monitoring, monitor vital signs, obtain 12 lead EKG if indicated  - Evaluate effectiveness of antiarrhythmic and heart rate control medications as ordered  - Initiate emergency measures for life threatening arrhythmias  - Monitor electrolytes and administer replacement therapy as ordered  Outcome: Progressing     Problem: RESPIRATORY - ADULT  Goal: Achieves optimal ventilation and oxygenation  Description: INTERVENTIONS:  - Assess for changes in respiratory status  - Assess for changes in mentation and behavior  - Position to facilitate oxygenation and minimize respiratory effort  - Oxygen supplementation based on oxygen saturation or ABGs  - Provide Smoking Cessation handout, if applicable  - Encourage broncho-pulmonary hygiene including cough, deep breathe, Incentive Spirometry  - Assess the need for suctioning and perform as needed  - Assess and instruct to report SOB or any respiratory difficulty  - Respiratory Therapy support as indicated  - Manage/alleviate anxiety  - Monitor for signs/symptoms of CO2  retention  Outcome: Progressing     Problem: GENITOURINARY - ADULT  Goal: Absence of urinary retention  Description: INTERVENTIONS:  - Assess patient’s ability to void and empty bladder  - Monitor intake/output and perform bladder scan as needed  - Follow urinary retention protocol/standard of care  - Consider collaborating with pharmacy to review patient's medication profile  - Implement strategies to promote bladder emptying  Outcome: Progressing     Problem: Impaired Swallowing  Goal: Minimize aspiration risk  Description: Interventions:  - Patient should be alert and upright for all feedings (90 degrees preferred)  - Offer food and liquids at a slow rate  - No straws  - Encourage small bites of food and small sips of liquid  - Offer pills one at a time, crush or deliver with applesauce as needed  - Discontinue feeding and notify MD (or speech pathologist) if coughing or persistent throat clearing or wet/gurgly vocal quality is noted  Outcome: Progressing     Problem: HEMATOLOGIC - ADULT  Goal: Free from bleeding injury  Description: (Example usage: patient with low platelets)  INTERVENTIONS:  - Avoid intramuscular injections, enemas and rectal medication administration  - Ensure safe mobilization of patient  - Hold pressure on venipuncture sites to achieve adequate hemostasis  - Assess for signs and symptoms of internal bleeding  - Monitor lab trends  - Patient is to report abnormal signs of bleeding to staff  - Avoid use of toothpicks and dental floss  - Use electric shaver for shaving  - Use soft bristle tooth brush  - Limit straining and forceful nose blowing  Outcome: Progressing     Problem: NEUROLOGICAL - ADULT  Goal: Achieves stable or improved neurological status  Description: INTERVENTIONS  - Assess for and report changes in neurological status  - Initiate measures to prevent increased intracranial pressure  - Maintain blood pressure and fluid volume within ordered parameters to optimize cerebral  perfusion and minimize risk of hemorrhage  - Monitor temperature, glucose, and sodium. Initiate appropriate interventions as ordered  Outcome: Progressing     Problem: Patient Centered Care  Goal: Patient preferences are identified and integrated in the patient's plan of care  Description: Interventions:  - What would you like us to know as we care for you?   - Provide timely, complete, and accurate information to patient/family  - Incorporate patient and family knowledge, values, beliefs, and cultural backgrounds into the planning and delivery of care  - Encourage patient/family to participate in care and decision-making at the level they choose  - Honor patient and family perspectives and choices  Outcome: Progressing

## 2024-03-22 NOTE — PROGRESS NOTES
Has received IVF, total of 1 L with improvement in BP. Levo weaned off. Echo reviewed. D/w RN and family at bedside. Currently patient is asymptomatic. No cp/sob. Feeling well.  Patient is transferred to Telemetry floor from ICU      Vitals:    24 1120   BP: 97/55   Pulse: 64   Resp: (!) 27   Temp:        Intake/Output Summary (Last 24 hours) at 3/22/2024 1200  Last data filed at 3/22/2024 0800  Gross per 24 hour   Intake 2456.06 ml   Output 1000 ml   Net 1456.06 ml     Wt Readings from Last 1 Encounters:   24 159 lb 2.8 oz (72.2 kg)        General: No acute distress.  Neck: Jugular venous pulsations not seen.  Lungs: Crackles at bilateral bases.  Heart: Normal rate. No murmurs.  Abdomen: Soft. Non tender  Extremities: No edema.  Right and left groin are soft  Neurological: Alert. No focal deficits.  Psychiatric: Appropriate mood and affect.      Medications Prior to Admission   Medication Sig    aspirin 81 MG Oral Tab EC Take 1 tablet (81 mg total) by mouth daily.    lansoprazole 30 MG Oral Capsule Delayed Release Take 1 capsule (30 mg total) by mouth every morning before breakfast.    [] aspirin 81 MG Oral Tab EC Take 1 tablet (81 mg total) by mouth daily.    [] ticagrelor 90 MG Oral Tab Take 1 tablet (90 mg total) by mouth.    carvedilol 3.125 MG Oral Tab Take 1 tablet (3.125 mg total) by mouth 2 (two) times daily with meals.    LORazepam 0.5 MG Oral Tab Take 1 tablet (0.5 mg total) by mouth nightly.    [] losartan 25 MG Oral Tab Take 0.5 tablets (12.5 mg total) by mouth daily.    mirtazapine 30 MG Oral Tab Take 1 tablet (30 mg total) by mouth nightly.    QUEtiapine 50 MG Oral Tab Take 1 tablet (50 mg total) by mouth nightly.    tamsulosin 0.4 MG Oral Cap Take 1 capsule (0.4 mg total) by mouth nightly.    traZODone 100 MG Oral Tab Take 1 tablet (100 mg total) by mouth nightly.    nitroglycerin 0.4 MG Sublingual SL Tab Place 1 tablet (0.4 mg total) under the tongue.     XR  CHEST AP PORTABLE  (CPT=71045)    Result Date: 3/21/2024  PROCEDURE: XR CHEST AP PORTABLE  (CPT=71045) TIME: 619  COMPARISON: [facility], XR CHEST AP PORTABLE (CPT=71045), 3/20/2024, 4:41 AM.  INDICATIONS: Follow up for right side central line.  TECHNIQUE:   Single view.   Findings and impression:  Tip of the PICC at the superior cavoatrial junction  Stable patchy opacity at the left base  Normal heart size with coronary stents, AVR; no edema       Dictated by (CST): Micha Caputo MD on 3/21/2024 at 7:54 AM     Finalized by (CST): Micha Caputo MD on 3/21/2024 at 7:55 AM             Lab Results   Component Value Date    WBC 5.3 03/22/2024    HGB 8.3 03/22/2024    HCT 26.2 03/22/2024    .0 03/22/2024    CREATSERUM 1.03 03/22/2024    BUN 19 03/22/2024     03/22/2024    K 3.7 03/22/2024     03/22/2024    CO2 23.0 03/22/2024    GLU 69 03/22/2024    CA 8.3 03/22/2024       IMPRESSION:   CAD with LAD and SVG to LAD .  Status post chronic total occlusion revascularization of the left into descending artery.  Stenting of the left main and the first marginal branch of the circumflex coronary arteries.  chronic systolic heart failure LVEF most recent echo 30-35% with RWMA in LAD territory  CVA by remote history  Paroxysmal afib, post op no recurrence.  No need to resume Xarelto.  Xarelto was only during the setting of non-ST elevation microinfarction and thrombosis within the saphenous vein graft.      PLAN:      Patient is weaned off the pressors  SBP~100  Stable from cardiac standpoint  Follow H&H  Now transferred to [facility]  Continue remaining cardiac meds as is.    D/w nursing staff    Jensen Duarte MD  [facility] Cardiovascular services

## 2024-03-22 NOTE — PAYOR COMM NOTE
Payor: [facility] PPO  Subscriber #:  [id number]  Authorization Number: [id number]    Admit date: 3/19/24  Admit time:  5:53 PM    ADMISSION DATE:       03/19/2024     HISTORY AND PHYSICAL EXAMINATION     CHIEF COMPLAINT:  Post complex chronic total occlusion LAD PCI.     HISTORY OF PRESENT ILLNESS:  The patient is a 78-year-old  male who was recently hospitalized at the [facility] with non-ST-elevation myocardial infarction.  He is known to have underlying ischemic cardiomyopathy.  He was found to have left ventricular apical thrombus, ejection fraction 35%, with severe mitral regurgitation.  He had an attempted intervention at that time.  Cardiac angiogram found chronic total occlusion of LAD graft and total LAD occlusion.  Patient was discharged in a stable condition.  He had another angiogram done on February 27 and had percutaneous transluminal angioplasty of the saphenous vein graft to the left anterior descending.  Today, he was brought in for chronic total occlusion LAD intervention.  Post procedure, he had some pulmonary edema, was given 1 dose of IV Lasix.  Also, noted to have a drop in his blood pressure and started on IV pressors and transferred to CCU for further close monitoring.       PAST MEDICAL HISTORY:  Coronary artery disease status post coronary artery bypass graft surgery and reported aortic valve replacement.  He had a history of hypertension, hyperlipidemia.  He has totally occluded LAD.  Recent SVG to LAD intervention; angioplasty; ischemic cardiomyopathy, ejection fraction of 35%; chronic kidney disease stage 3; benign prostate hypertrophy; osteoarthritis; osteoporosis; left ventricular apical thrombus, anticoagulated with Xarelto.  Reported also history of paroxysmal atrial fibrillation and possible underlying chronic obstructive pulmonary disease.     PAST SURGICAL HISTORY:  Coronary artery bypass graft surgery, left femur open reduction and internal fixation.     REVIEW  OF SYSTEMS:  Currently resting in bed.  No chest pain.  No shortness of breath.  No abdominal pain.  Other 12-point review of systems is negative.       PHYSICAL EXAMINATION:    GENERAL:  Alert and oriented to time, place, and person.    VITAL SIGNS:  Temperature 97.0; pulse 100; respiratory rate 28; blood pressure 104/72, improved from 76/57; pulse ox 100% on nasal cannula oxygen.  HEENT:  Atraumatic.  Oropharynx clear.  Dry mucous membranes.   NECK:  Supple.  No lymphadenopathy.  Mild jugular venous distention.  LUNGS:  Clear to auscultation bilaterally.    HEART:  Regular rate, rhythm.  S1 and S2 auscultated.  No murmur.   ABDOMEN:  Soft, nondistended.  No tenderness.  Positive bowel sounds.    EXTREMITIES:  No edema, clubbing, or cyanosis.  Left groin dressing.  NEUROLOGIC:  Motor and sensory intact.     ASSESSMENT:    1.       Coronary artery disease, status post complex intervention to chronic total occlusion of left anterior descending via saphenous vein graft.  Complicated by retrograde dissection to the left main, status post distal left main, LAD, and obtuse marginal stenting.  2.       Hypotension and mild pulmonary edema.  Received 1 dose of IV Lasix.  Currently on IV Levophed.  3.       Ischemic cardiomyopathy.  4.       Hypoxemia.     PLAN:  Patient will be admitted to intensive care unit.  Continue IV pressor.  Monitor hemodynamic status.  Diurese as needed.  Dual antiplatelet therapy and as tolerated beta-blocker, Coreg.  Cardiology and intensive care consult.  Monitor his hemodynamic and respiratory status.  Continue oxygen support.  Further recommendations to follow.        3/20/24  Temp:  [96 °F (35.6 °C)-98.5 °F (36.9 °C)] 98.5 °F (36.9 °C)  Pulse:  [] 77  Resp:  [16-38] 22  BP: ()/(42-82) 102/55  SpO2:  [87 %-100 %] 97 %  AO: ()/(42-64) 118/51    General:  Alert  HEENT:  Normocephalic, atraumatic  Cardiac:  Regular rate, regular rhythm  Pulmonary:  Clear to auscultation  bilaterally, respirations unlabored  Gastrointestinal:  Soft, non-tender, normal bowel sounds  Musculoskeletal:  No joint swelling  Extremities:  No edema, no cyanosis, no clubbing  Neurologic:  nonfocal  Psychiatric:  Normal affect, calm and appropriate  Lab 03/19/24 2121 03/20/24  0449   WBC 18.5* 15.8*   HGB 14.0 12.6*   HCT 44.1 39.6   .0 280.0   RBC 4.70 4.28   MCV 93.8 92.5   MCH 29.8 29.4   MCHC 31.7 31.8   RDW 18.9* 18.7*   NEPRELIM 16.23* 13.83*      Lab 03/19/24  1908 03/20/24  0449   BUN 12 23   CREATSERUM 1.20 1.35*   CA 9.3 9.5    136   K 4.3 4.8    105   CO2 18.0* 21.0   * 129*     XR CHEST AP PORTABLE  (CPT=71045)  Result Date: 3/20/2024  CONCLUSION: Right upper extremity central venous catheter with the tip projecting over the expected location of the right atrium.  Consider retraction.  Unchanged patchy bilateral airspace opacities.  Small left pleural effusion.  A preliminary report was issued by the [facility] teleradiology service. There are no clinically actionable discrepancies.    Dictated by (CST): Radha Briggs MD on 3/20/2024 at 8:28 AM     Finalized by (CST): Radha Briggs MD on 3/20/2024 at 8:30 AM           XR CHEST AP PORTABLE  (CPT=71045)  Result Date: 3/19/2024  CONCLUSION:   Prominent bronchovascular markings, nonspecific, may indicate scarring/COPD changes or pulmonary edema.  More focal opacities in the right upper lung may indicate infection.     elm-remote   Dictated by (CST): Hussain Guevara MD on 3/19/2024 at 7:48 PM     Finalized by (CST): Hussain Guevara MD on 3/19/2024 at 7:52 PM           EKG 12 Lead   Result Date: 3/20/2024  Sinus rhythm with Premature atrial complexes with Aberrant conduction Anterolateral infarct (cited on or before 19-MAR-2024) Abnormal ECG When compared with ECG of 19-MAR-2024 18:34, Aberrant conduction is now Present Serial changes of evolving Anterior infarct Present Serial changes of evolving Anterolateral infarct  Present     Assessment and Plan:     CAD  H/o CABG and AVR  Post-op Hypotension with pulmonary edema  -s/p complex intervention to chronic total occlusion of LAD via saphenous vein graft  -still on levophed, wean as able  -cont asa, brilinta, coreg, losartan  -lasix IV given     Bleeding from cath site  -stopped     Ischemic cardiomyopathy  Acute systolic CHF  Acute on chronic hypoxic respiratory failure  -s/p lasix IV  -monitor     Other:  H/o HTN - BP low now and on pressors  BPH  CKD stage 3      DVT Mechanical Prophylaxis:   SCDs      CARDIOLOGY  IMPRESSION: CAD with LAD and SVG to LAD .  Status post chronic total occlusion revascularization of the left into descending artery.  Stenting of the left main and the first marginal branch of the circumflex coronary arteries.  Acute on chronic systolic heart failure   Cardiogenic shock requiring pressor support  CVA by remote history  Paroxysmal afib, post op no recurrence.  No need to resume Xarelto.  Xarelto was only during the setting of non-ST elevation microinfarction and thrombosis within the saphenous vein graft.     PLAN:      Wean Levophed as blood pressure tolerates.  There was mild rising creatinine.  Urine output has been marginal.  Will continue to monitor this.  Resumed antiplatelets.  Follow-up on echocardiogram.     CVP is low will try IV fluids.        3/21/24  Temp:  [96.8 °F (36 °C)-98.4 °F (36.9 °C)] 98.4 °F (36.9 °C)  Pulse:  [65-85] 68  Resp:  [15-32] 30  BP: ()/(49-75) 109/67  SpO2:  [91 %-100 %] 95 %  AO: ()/(37-58) 85/37    Lab 03/19/24  2121 03/20/24  0449 03/21/24  0547   WBC 18.5* 15.8* 8.1   HGB 14.0 12.6* 9.0*   HCT 44.1 39.6 27.5*   .0 280.0 171.0   RBC 4.70 4.28 3.03*   MCV 93.8 92.5 90.8   MCH 29.8 29.4 29.7   MCHC 31.7 31.8 32.7   RDW 18.9* 18.7* 18.6*   NEPRELIM 16.23* 13.83* 5.13      Lab 03/19/24  1908 03/20/24  0449 03/21/24  0547   BUN 12 23 23   CREATSERUM 1.20 1.35* 1.22   CA 9.3 9.5 8.3*    136 139    K 4.3 4.8 3.9    105 109   CO2 18.0* 21.0 23.0   * 129* 86       XR CHEST AP PORTABLE  (CPT=71045)  Result Date: 3/21/2024  PROCEDURE:                                                                                           XR CHEST AP PORTABLE  (CPT=71045) TIME:                                                             619  COMPARISON:       [facility], XR CHEST AP PORTABLE (CPT=71045), 3/20/2024, 4:41 AM.  INDICATIONS:          Follow up for right side central line.  TECHNIQUE:                                              Single view.   Findings and impression:  Tip of the PICC at the superior cavoatrial junction  Stable patchy opacity at the left base  Normal heart size with coronary stents, AVR; no edema       Dictated by (CST): Micha Caputo MD on 3/21/2024 at 7:54 AM     Finalized by (CST): Micha Caputo MD on 3/21/2024 at 7:55 AM            Assessment and Plan:     CAD  H/o CABG and AVR  Post-op Hypotension with pulmonary edema  -s/p complex intervention to chronic total occlusion of LAD via saphenous vein graft  -still on levophed, wean as able - down to 2mcg  -cont asa, brilinta, coreg, losartan  -lasix IV given     Bleeding from cath site  Acute blood loss anemia  -bleeding stopped  -hgb lower today  -monitor hgb     Ischemic cardiomyopathy  Acute systolic CHF  Acute on chronic hypoxic respiratory failure  -s/p lasix IV  -monitor     Other:  H/o HTN - BP low now and on pressors  BPH  CKD stage 3      DVT Mechanical Prophylaxis:   SCDs       PULMONOLOGY  Assessment and Plan:   1.  Acute on chronic respiratory failure-the patient had mild pulmonary edema exacerbating COPD.  Now improved clinically.  Minimal left basilar atelectasis.     Recommendations:  1.  Lasix  2.  Taper oxygen  3.  Will follow clinically.     2.  Severe ischemic cardiomyopathy-ejection fraction 35% with history of CABG and severe MR with chronic atrial fibrillation and recent non-ST elevation myocardial  infarction now status post PCI.     Recommendations:  1.  As per cardiology  2.  Brilinta, aspirin, Coreg, Xarelto     3.  Leukocytosis-resolved     4.  DVT prophylaxis-on SCDs with question of hematoma.  Resume Xarelto when okay with cardiology.      CARDIOLOGY  IMPRESSION:   CAD with LAD and SVG to LAD .  Status post chronic total occlusion revascularization of the left into descending artery.  Stenting of the left main and the first marginal branch of the circumflex coronary arteries.  chronic systolic heart failure LVEF most recent echo 30-35% with RWMA in LAD territory  CVA by remote history  Paroxysmal afib, post op no recurrence.  No need to resume Xarelto.  Xarelto was only during the setting of non-ST elevation microinfarction and thrombosis within the saphenous vein graft.      PLAN:      Continue weaning off pressors. If needed, give additional 500 ml bolus to help with wean off  Continue remaining cardiac meds as is.        3/22/24  Lab Results   Component Value Date     WBC 5.3 03/22/2024     HGB 8.3 (L) 03/22/2024     HCT 26.2 (L) 03/22/2024     .0 (L) 03/22/2024     CREATSERUM 1.03 03/22/2024     BUN 19 03/22/2024      03/22/2024     K 3.7 03/22/2024      03/22/2024     CO2 23.0 03/22/2024     GLU 69 (L) 03/22/2024     CA 8.3 (L) 03/22/2024   Assessment & Plan:      1-postprocedure acute on chronic hypoxemic respiratory failure ( pulmonary edema )   S/p cardiac cath PCI/stent LAD and OM and left main IVUS guidance   CXR better   Clinically better / on 1-2 L NC ( about baseline )       2- s/p cardiogenic shock  Resolved / off pressors      3- severe ischemic cardiomyopathy LVEF 35 % h/o CABG with severe mitral regurgitation and chronic A-fib  recent NSTEMI with occluded LAD graft failed intervention on 2/27  S/p cath on 3/19 with PCI to LAD graft, OM stent, left main stent IVUS guidance     Brilinta, aspirin, Coreg     4-COPD on home O2 at 2 L  No evidence of acute exacerbation  with No wheezes or cough  O2 therapy  Neb as needed     5-leukocytosis which is likely reactive and better today  Resolved      6-DVT prophylaxis  Heparin subcu       MEDICATIONS ADMINISTERED IN LAST 1 DAY:  acetaminophen (Ofirmev) 10 mg/mL infusion premix 1,000 mg       Date Action Dose Route User    3/21/2024 1737 New Bag 1,000 mg Intravenous Estefany Arrieta RN          acetaminophen (Tylenol) tab 650 mg       Date Action Dose Route User    3/22/2024 0951 Given 650 mg Oral Mimi Ren RN          aspirin DR tab 81 mg       Date Action Dose Route User    3/22/2024 0757 Given 81 mg Oral Mimi Ren RN          heparin (Porcine) 5000 UNIT/ML injection 5,000 Units       Date Action Dose Route User    3/22/2024 0757 Given 5,000 Units Subcutaneous (Left Lower Abdomen) Mimi Ren RN    3/21/2024 2037 Given 5,000 Units Subcutaneous (Bilateral Lower Abdomen) Cedrick Jones RN          LORazepam (Ativan) tab 0.25 mg       Date Action Dose Route User    3/21/2024 2037 Given 0.25 mg Oral Cedrick Jones RN          norepinephrine (Levophed) 4 mg/250mL infusion premix       Date Action Dose Route User    3/21/2024 1240 Rate/Dose Change 1 mcg/min Intravenous Estefany Arrieta RN          pantoprazole (Protonix) DR tab 40 mg       Date Action Dose Route User    3/22/2024 0605 Given 40 mg Oral Cedrick Jones RN          sodium chloride 0.9% infusion       Date Action Dose Route User    3/22/2024 0500 New Bag (none) Intravenous Cedrick Jones RN    3/21/2024 1807 New Bag (none) Intravenous Estefany Arrieta RN          sodium chloride 0.9 % IV bolus 250 mL       Date Action Dose Route User    3/21/2024 1240 New Bag 250 mL Intravenous Estefany Arrieta RN          ticagrelor (Brilinta) tab 90 mg       Date Action Dose Route User    3/22/2024 0757 Given 90 mg Oral Mimi Ren RN    3/21/2024 2037 Given 90 mg Oral Cedrick Jones RN            Vitals (last day)       Date/Time Temp  Pulse Resp BP SpO2 Weight O2 Device O2 Flow Rate (L/min) [facility]    03/22/24 1120 -- 64 27 97/55 99 % -- Nasal cannula --     03/22/24 1100 -- 67 17 89/62 98 % -- Nasal cannula 1 L/min     03/22/24 0800 97.3 °F (36.3 °C) 69 29 102/64 100 % -- Nasal cannula 1 L/min     03/22/24 0400 96.7 °F (35.9 °C) 70 29 101/62 95 % 159 lb 2.8 oz Nasal cannula 2 L/min     03/22/24 0000 97.1 °F (36.2 °C) 63 32 102/54 99 % -- Nasal cannula 2 L/min     03/21/24 2300 -- 66 21 89/54 98 % -- Nasal cannula 2 L/min     03/21/24 2100 -- 68 22 91/51 99 % -- Nasal cannula 2 L/min     03/21/24 2000 97.3 °F (36.3 °C) 63 21 91/53 100 % -- Nasal cannula 2 L/min     03/21/24 1800 -- 71 28 86/53 99 % -- Nasal cannula 2 L/min     03/21/24 1200 98 °F (36.7 °C) 66 21 92/58 94 % -- None (Room air) -- JR

## 2024-03-22 NOTE — PROGRESS NOTES
[facility]  Progress Note     Gurdeep Sagastume  : 1945    Status: Inpatient  Day #: 3    Attending: Royer Null MD  PCP: No primary care provider on file.     Assessment and Plan:    CAD  H/o CABG and AVR  Post-op Hypotension with pulmonary edema  -s/p complex intervention to chronic total occlusion of LAD via saphenous vein graft  -weaned off levophed  -cont asa, brilinta, coreg, losartan  -lasix IV given  -stop IVF    Bleeding from cath site  Acute blood loss anemia  -bleeding stopped  -hgb lower today  -monitor hgb  -start iron pill    Ischemic cardiomyopathy  Acute systolic CHF  Acute on chronic hypoxic respiratory failure  -s/p lasix IV  -monitor    Other:  H/o HTN   BPH  CKD stage 3  Paroxysmal afib - no need to resume xarelto per cardiology    Dispo:  PT/OT eval. Discharge planning       DVT Mechanical Prophylaxis:   SCDs,    DVT Pharmacologic Prophylaxis   Medication    heparin (Porcine) 5000 UNIT/ML injection 5,000 Units         DVT Pharmacologic prophylaxis: Aspirin 81 mg     Subjective:      Initial Chief Complaint:  low BP post angiogram    No CP, no SOB. No dizziness. Trouble sleeping    10 point ROS completed and was negative, except for pertinent positive and negatives stated in subjective.      Objective:      Temp:  [96.7 °F (35.9 °C)-98.1 °F (36.7 °C)] 98.1 °F (36.7 °C)  Pulse:  [62-77] 68  Resp:  [17-34] 24  BP: ()/(49-72) 102/72  SpO2:  [94 %-100 %] 98 %  General:  Alert, no distress  HEENT:  Normocephalic, atraumatic  Cardiac:  Regular rate, regular rhythm  Pulmonary:  Clear to auscultation bilaterally, respirations unlabored  Gastrointestinal:  Soft, non-tender, normal bowel sounds  Musculoskeletal:  No joint swelling  Extremities:  No edema, no cyanosis, no clubbing  Neurologic:  nonfocal  Psychiatric:  Normal affect, calm and appropriate    Intake/Output Summary (Last 24 hours) at 3/22/2024 1347  Last data filed at 3/22/2024 1211  Gross per 24 hour   Intake   ml   Output 1000 ml   Net 1034.83 ml         Recent Labs   Lab 03/19/24 2121 03/20/24  0449 03/21/24  0547 03/22/24  0500   WBC 18.5* 15.8* 8.1 5.3   HGB 14.0 12.6* 9.0* 8.3*   HCT 44.1 39.6 27.5* 26.2*   .0 280.0 171.0 122.0*   RBC 4.70 4.28 3.03* 2.82*   MCV 93.8 92.5 90.8 92.9   MCH 29.8 29.4 29.7 29.4   MCHC 31.7 31.8 32.7 31.7   RDW 18.9* 18.7* 18.6* 18.6*   NEPRELIM 16.23* 13.83* 5.13  --      Recent Labs   Lab 03/20/24 0449 03/21/24  0547 03/22/24  0500   BUN 23 23 19   CREATSERUM 1.35* 1.22 1.03   CA 9.5 8.3* 8.3*    139 140   K 4.8 3.9 3.7    109 112   CO2 21.0 23.0 23.0   * 86 69*       XR CHEST AP PORTABLE  (CPT=71045)    Result Date: 3/21/2024  PROCEDURE: XR CHEST AP PORTABLE  (CPT=71045) TIME: 619  COMPARISON: [facility], XR CHEST AP PORTABLE (CPT=71045), 3/20/2024, 4:41 AM.  INDICATIONS: Follow up for right side central line.  TECHNIQUE:   Single view.   Findings and impression:  Tip of the PICC at the superior cavoatrial junction  Stable patchy opacity at the left base  Normal heart size with coronary stents, AVR; no edema       Dictated by (CST): Micha Caputo MD on 3/21/2024 at 7:54 AM     Finalized by (CST): Micha Caputo MD on 3/21/2024 at 7:55 AM             Medications:   potassium chloride  40 mEq Oral Once    heparin  5,000 Units Subcutaneous 2 times per day    LORazepam  0.25 mg Oral Nightly    aspirin  81 mg Oral Daily    carvedilol  3.125 mg Oral BID with meals    losartan  12.5 mg Oral Daily    ticagrelor  90 mg Oral BID    pantoprazole  40 mg Oral QAM AC      PRN Meds: acetaminophen, lidocaine, ipratropium-albuterol, ondansetron    Supplementary Documentation:        MDM High. Time spent on chart/note review, review of labs/imaging, discussion with patient, physical exam, discussion with staff, consultants, coordinating care, writing progress note, discussion of plan of care.      Royer Null MD

## 2024-03-22 NOTE — PROGRESS NOTES
[facility]  part of [facility]    Progress Note    Gurdeep Sagastume Patient Status:  Inpatient    1945 MRN [id number]   Location [facility] 2W/SW Attending Royer Null MD   Hosp Day # 3 PCP No primary care provider on file.         Subjective:     Constitutional:  Negative for fever.   HENT:  Negative for congestion.    Respiratory:  Negative for cough and shortness of breath.    Cardiovascular:  Negative for chest pain.   Gastrointestinal:  Negative for abdominal distention.   Neurological:  Negative for seizures.   Psychiatric/Behavioral:  Negative for agitation.      Patient was seen and examined  Feeling much better with no dyspnea or cough  Denies chest pain or abdominal pain  Denied fever    Objective:   Blood pressure 97/55, pulse 64, temperature 97.3 °F (36.3 °C), temperature source Temporal, resp. rate (!) 27, height 5' 11\" (1.803 m), weight 159 lb 2.8 oz (72.2 kg), SpO2 99%.  Physical Exam  Constitutional:       General: He is not in acute distress.     Appearance: He is ill-appearing.   HENT:      Head: Normocephalic and atraumatic.      Nose: Nose normal.   Eyes:      General: No scleral icterus.  Cardiovascular:      Rate and Rhythm: Normal rate.      Heart sounds:      No gallop.   Pulmonary:      Effort: No respiratory distress.      Breath sounds: No stridor. No wheezing, rhonchi or rales.   Abdominal:      General: Abdomen is flat. Bowel sounds are normal.      Palpations: Abdomen is soft.      Tenderness: There is no guarding.   Musculoskeletal:      Cervical back: Normal range of motion and neck supple.      Right lower leg: No edema.      Left lower leg: No edema.   Neurological:      Mental Status: He is oriented to person, place, and time. Mental status is at baseline.         Results:   Lab Results   Component Value Date    WBC 5.3 2024    HGB 8.3 (L) 2024    HCT 26.2 (L) 2024    .0 (L) 2024    CREATSERUM 1.03 2024    BUN  19 03/22/2024     03/22/2024    K 3.7 03/22/2024     03/22/2024    CO2 23.0 03/22/2024    GLU 69 (L) 03/22/2024    CA 8.3 (L) 03/22/2024       XR CHEST AP PORTABLE  (CPT=71045)    Result Date: 3/21/2024  PROCEDURE: XR CHEST AP PORTABLE  (CPT=71045) TIME: 619  COMPARISON: [facility], XR CHEST AP PORTABLE (CPT=71045), 3/20/2024, 4:41 AM.  INDICATIONS: Follow up for right side central line.  TECHNIQUE:   Single view.   Findings and impression:  Tip of the PICC at the superior cavoatrial junction  Stable patchy opacity at the left base  Normal heart size with coronary stents, AVR; no edema       Dictated by (CST): Micha Caputo MD on 3/21/2024 at 7:54 AM     Finalized by (CST): Micha Caputo MD on 3/21/2024 at 7:55 AM               Assessment & Plan:     1-postprocedure acute on chronic hypoxemic respiratory failure ( pulmonary edema )   S/p cardiac cath PCI/stent LAD and OM and left main IVUS guidance   CXR better   Clinically better / on 1-2 L NC ( about baseline )        2- s/p cardiogenic shock  Resolved / off pressors      3- severe ischemic cardiomyopathy LVEF 35 % h/o CABG with severe mitral regurgitation and chronic A-fib  recent NSTEMI with occluded LAD graft failed intervention on 2/27  S/p cath on 3/19 with PCI to LAD graft, OM stent, left main stent IVUS guidance     Brilinta, aspirin, Coreg     4-COPD on home O2 at 2 L  No evidence of acute exacerbation with No wheezes or cough  O2 therapy  Neb as needed     5-leukocytosis which is likely reactive and better today  Resolved      6-DVT prophylaxis  Heparin subcu        D/w staff   Okay to transfer to telemetry                 Greta Ribeiro MD  3/22/2024

## 2024-03-22 NOTE — PLAN OF CARE
Pt remains off levo > 12 hrs. BP at > 90 systolic. Maintenance fluids at 50 ml/hr  Problem: CARDIOVASCULAR - ADULT  Goal: Maintains optimal cardiac output and hemodynamic stability  Description: INTERVENTIONS:  - Monitor vital signs, rhythm, and trends  - Monitor for bleeding, hypotension and signs of decreased cardiac output  - Evaluate effectiveness of vasoactive medications to optimize hemodynamic stability  - Monitor arterial and/or venous puncture sites for bleeding and/or hematoma  - Assess quality of pulses, skin color and temperature  - Assess for signs of decreased coronary artery perfusion - ex. Angina  - Evaluate fluid balance, assess for edema, trend weights  Outcome: Progressing   Pt tolerated mildly thick liquids well. Verbalized desire to go home today.  Problem: Impaired Swallowing  Goal: Minimize aspiration risk  Description: Interventions:  - Patient should be alert and upright for all feedings (90 degrees preferred)  - Offer food and liquids at a slow rate  - No straws  - Encourage small bites of food and small sips of liquid  - Offer pills one at a time, crush or deliver with applesauce as needed  - Discontinue feeding and notify MD (or speech pathologist) if coughing or persistent throat clearing or wet/gurgly vocal quality is noted  Outcome: Progressing   His bilateral femoral area was checked - no bleed or hematoma was noted  Problem: HEMATOLOGIC - ADULT  Goal: Free from bleeding injury  Description: (Example usage: patient with low platelets)  INTERVENTIONS:  - Avoid intramuscular injections, enemas and rectal medication administration  - Ensure safe mobilization of patient  - Hold pressure on venipuncture sites to achieve adequate hemostasis  - Assess for signs and symptoms of internal bleeding  - Monitor lab trends  - Patient is to report abnormal signs of bleeding to staff  - Avoid use of toothpicks and dental floss  - Use electric shaver for shaving  - Use soft bristle tooth brush  -  Limit straining and forceful nose blowing  Outcome: Progressing

## 2024-03-23 LAB
DEPRECATED RDW RBC AUTO: 65.1 FL (ref 35.1–46.3)
ERYTHROCYTE [DISTWIDTH] IN BLOOD BY AUTOMATED COUNT: 19 % (ref 11–15)
HCT VFR BLD AUTO: 29.9 %
HGB BLD-MCNC: 9.6 G/DL
MCH RBC QN AUTO: 29.5 PG (ref 26–34)
MCHC RBC AUTO-ENTMCNC: 32.1 G/DL (ref 31–37)
MCV RBC AUTO: 92 FL
PLATELET # BLD AUTO: 142 10(3)UL (ref 150–450)
POTASSIUM SERPL-SCNC: 4.5 MMOL/L (ref 3.5–5.1)
RBC # BLD AUTO: 3.25 X10(6)UL
WBC # BLD AUTO: 4.9 X10(3) UL (ref 4–11)

## 2024-03-23 PROCEDURE — 99232 SBSQ HOSP IP/OBS MODERATE 35: CPT | Performed by: INTERNAL MEDICINE

## 2024-03-23 PROCEDURE — 99233 SBSQ HOSP IP/OBS HIGH 50: CPT | Performed by: HOSPITALIST

## 2024-03-23 RX ORDER — MIRTAZAPINE 7.5 MG/1
30 TABLET, FILM COATED ORAL NIGHTLY
Status: DISCONTINUED | OUTPATIENT
Start: 2024-03-23 | End: 2024-03-25

## 2024-03-23 RX ADMIN — MELATONIN 325 MG: at 09:23:00

## 2024-03-23 RX ADMIN — MIRTAZAPINE 30 MG: 7.5 TABLET, FILM COATED ORAL at 21:51:00

## 2024-03-23 RX ADMIN — PANTOPRAZOLE SODIUM 40 MG: 40 TABLET, DELAYED RELEASE ORAL at 04:28:00

## 2024-03-23 RX ADMIN — ASPIRIN 81 MG: 81 TABLET ORAL at 09:23:00

## 2024-03-23 RX ADMIN — HEPARIN SODIUM 5000 UNITS: 5000 INJECTION, SOLUTION INTRAVENOUS; SUBCUTANEOUS at 21:52:00

## 2024-03-23 RX ADMIN — ACETAMINOPHEN 650 MG: 325 TABLET ORAL at 21:51:00

## 2024-03-23 RX ADMIN — HEPARIN SODIUM 5000 UNITS: 5000 INJECTION, SOLUTION INTRAVENOUS; SUBCUTANEOUS at 09:23:00

## 2024-03-23 RX ADMIN — LORAZEPAM 0.5 MG: 0.5 TABLET ORAL at 21:51:00

## 2024-03-23 NOTE — PROGRESS NOTES
[facility]  part of [facility]    Progress Note    Gurdeep Sagastume Patient Status:  Inpatient    1945 MRN [id number]   Location [facility] 3W/SW Attending Royer Null MD   Hosp Day # 4 PCP No primary care provider on file.         Subjective:     Constitutional: Negative.    Respiratory: Negative.     Cardiovascular: Negative.    Gastrointestinal: Negative.      Patient was seen and examined  Comfortable on 1 L of oxygen about baseline denied any chest pain shortness of breath or cough or fever  Objective:   Blood pressure 111/64, pulse 62, temperature 98.2 °F (36.8 °C), temperature source Oral, resp. rate 16, height 5' 11\" (1.803 m), weight 154 lb 8 oz (70.1 kg), SpO2 98%.  Physical Exam  Constitutional:       General: He is not in acute distress.     Appearance: Normal appearance. He is ill-appearing.   HENT:      Head: Atraumatic.      Mouth/Throat:      Mouth: Mucous membranes are moist.   Eyes:      General: No scleral icterus.  Cardiovascular:      Rate and Rhythm: Normal rate.      Heart sounds:      No gallop.   Pulmonary:      Effort: No respiratory distress.      Breath sounds: No stridor. No wheezing, rhonchi or rales.   Chest:      Chest wall: No tenderness.   Abdominal:      General: Abdomen is flat. Bowel sounds are normal.      Palpations: Abdomen is soft.   Musculoskeletal:      Cervical back: Normal range of motion.      Right lower leg: No edema.      Left lower leg: No edema.   Neurological:      Mental Status: He is oriented to person, place, and time. Mental status is at baseline.         Results:   Lab Results   Component Value Date    WBC 4.9 2024    HGB 9.6 (L) 2024    HCT 29.9 (L) 2024    .0 (L) 2024    CREATSERUM 1.03 2024    BUN 19 2024     2024    K 4.5 2024     2024    CO2 23.0 2024    GLU 69 (L) 2024    CA 8.3 (L) 2024         Assessment & Plan:       1-postprocedure acute on chronic hypoxemic respiratory failure ( pulmonary edema )   S/p cardiac cath PCI/stent LAD and OM and left main IVUS guidance   CXR better   Clinically better / on 1-2 L NC ( about baseline )         2- s/p cardiogenic shock  Resolved / off pressors      3- severe ischemic cardiomyopathy LVEF 35 % h/o CABG with severe mitral regurgitation and chronic A-fib  recent NSTEMI with occluded LAD graft failed intervention on 2/27  S/p cath on 3/19 with PCI to LAD graft, OM stent, left main stent IVUS guidance     Brilinta, aspirin, Coreg     4-COPD on home O2 at 1 L  No evidence of acute exacerbation with No wheezes or cough  O2 therapy  Neb as needed     5-leukocytosis which is likely reactive and better today  Resolved      6-DVT prophylaxis  Heparin subcu        Pulmonary status now stable abut  baseline and okay with me to discharge                 Greta Ribeiro MD  3/23/2024

## 2024-03-23 NOTE — PROGRESS NOTES
[facility]  Progress Note     Gurdeep Sagastume  : 1945    Status: Inpatient  Day #: 4    Attending: Royer Null MD  PCP: No primary care provider on file.     Assessment and Plan:    CAD  H/o CABG and AVR  Post-op Hypotension with pulmonary edema  -s/p complex intervention to chronic total occlusion of LAD via saphenous vein graft  -weaned off levophed  -cont asa, brilinta, coreg, losartan  -lasix IV given  -off IVF    Bleeding from cath site  Acute blood loss anemia  -bleeding stopped  -hgb improved  -started iron pill    Ischemic cardiomyopathy  Acute systolic CHF  Acute on chronic hypoxic respiratory failure  -s/p lasix IV  -monitor    Other:  H/o HTN   BPH  CKD stage 3  Paroxysmal afib - no need to resume xarelto per cardiology    Dispo:  PT/OT eval. Discharge planning, hopefully tomorrow.       DVT Mechanical Prophylaxis:   SCDs,    DVT Pharmacologic Prophylaxis   Medication    heparin (Porcine) 5000 UNIT/ML injection 5,000 Units         DVT Pharmacologic prophylaxis: Aspirin 81 mg     Subjective:      Initial Chief Complaint:  low BP post angiogram    No CP, no SOB. No dizziness. Trouble sleeping    10 point ROS completed and was negative, except for pertinent positive and negatives stated in subjective.      Objective:      Temp:  [97.6 °F (36.4 °C)-98.2 °F (36.8 °C)] 98.2 °F (36.8 °C)  Pulse:  [62-76] 62  Resp:  [16-27] 16  BP: ()/(55-72) 111/64  SpO2:  [96 %-99 %] 98 %  General:  Alert, no distress  HEENT:  Normocephalic, atraumatic  Cardiac:  Regular rate, regular rhythm  Pulmonary:  Clear to auscultation bilaterally, respirations unlabored  Gastrointestinal:  Soft, non-tender, normal bowel sounds  Musculoskeletal:  No joint swelling  Extremities:  No edema, no cyanosis, no clubbing  Neurologic:  nonfocal  Psychiatric:  Normal affect, calm and appropriate    Intake/Output Summary (Last 24 hours) at 3/23/2024 1056  Last data filed at 3/23/2024 0855  Gross per 24 hour   Intake 440  ml   Output 1150 ml   Net -710 ml         Recent Labs   Lab 03/19/24 2121 03/20/24  0449 03/21/24  0547 03/22/24  0500 03/23/24  0715   WBC 18.5* 15.8* 8.1 5.3 4.9   HGB 14.0 12.6* 9.0* 8.3* 9.6*   HCT 44.1 39.6 27.5* 26.2* 29.9*   .0 280.0 171.0 122.0* 142.0*   RBC 4.70 4.28 3.03* 2.82* 3.25*   MCV 93.8 92.5 90.8 92.9 92.0   MCH 29.8 29.4 29.7 29.4 29.5   MCHC 31.7 31.8 32.7 31.7 32.1   RDW 18.9* 18.7* 18.6* 18.6* 19.0*   NEPRELIM 16.23* 13.83* 5.13  --   --      Recent Labs   Lab 03/20/24 0449 03/21/24  0547 03/22/24  0500 03/23/24  0715   BUN 23 23 19  --    CREATSERUM 1.35* 1.22 1.03  --    CA 9.5 8.3* 8.3*  --     139 140  --    K 4.8 3.9 3.7 4.5    109 112  --    CO2 21.0 23.0 23.0  --    * 86 69*  --        No results found.      Medications:   mirtazapine  30 mg Oral Nightly    LORazepam  0.5 mg Oral Nightly    ferrous sulfate  325 mg Oral Daily with breakfast    heparin  5,000 Units Subcutaneous 2 times per day    aspirin  81 mg Oral Daily    carvedilol  3.125 mg Oral BID with meals    losartan  12.5 mg Oral Daily    ticagrelor  90 mg Oral BID    pantoprazole  40 mg Oral QAM AC      PRN Meds: acetaminophen, lidocaine, ipratropium-albuterol, ondansetron    Supplementary Documentation:        MDM High. Time spent on chart/note review, review of labs/imaging, discussion with patient, physical exam, discussion with staff, consultants, coordinating care, writing progress note, discussion of plan of care.      Royer Null MD

## 2024-03-23 NOTE — PLAN OF CARE
Pt A/Ox4, complete. 1L O2. Groin sites clean and intact. Plan for home health once medically cleared.     Problem: CARDIOVASCULAR - ADULT  Goal: Maintains optimal cardiac output and hemodynamic stability  Description: INTERVENTIONS:  - Monitor vital signs, rhythm, and trends  - Monitor for bleeding, hypotension and signs of decreased cardiac output  - Evaluate effectiveness of vasoactive medications to optimize hemodynamic stability  - Monitor arterial and/or venous puncture sites for bleeding and/or hematoma  - Assess quality of pulses, skin color and temperature  - Assess for signs of decreased coronary artery perfusion - ex. Angina  - Evaluate fluid balance, assess for edema, trend weights  Outcome: Progressing  Goal: Absence of cardiac arrhythmias or at baseline  Description: INTERVENTIONS:  - Continuous cardiac monitoring, monitor vital signs, obtain 12 lead EKG if indicated  - Evaluate effectiveness of antiarrhythmic and heart rate control medications as ordered  - Initiate emergency measures for life threatening arrhythmias  - Monitor electrolytes and administer replacement therapy as ordered  Outcome: Progressing     Problem: RESPIRATORY - ADULT  Goal: Achieves optimal ventilation and oxygenation  Description: INTERVENTIONS:  - Assess for changes in respiratory status  - Assess for changes in mentation and behavior  - Position to facilitate oxygenation and minimize respiratory effort  - Oxygen supplementation based on oxygen saturation or ABGs  - Provide Smoking Cessation handout, if applicable  - Encourage broncho-pulmonary hygiene including cough, deep breathe, Incentive Spirometry  - Assess the need for suctioning and perform as needed  - Assess and instruct to report SOB or any respiratory difficulty  - Respiratory Therapy support as indicated  - Manage/alleviate anxiety  - Monitor for signs/symptoms of CO2 retention  Outcome: Progressing     Problem: GENITOURINARY - ADULT  Goal: Absence of urinary  retention  Description: INTERVENTIONS:  - Assess patient’s ability to void and empty bladder  - Monitor intake/output and perform bladder scan as needed  - Follow urinary retention protocol/standard of care  - Consider collaborating with pharmacy to review patient's medication profile  - Implement strategies to promote bladder emptying  Outcome: Progressing     Problem: Impaired Swallowing  Goal: Minimize aspiration risk  Description: Interventions:  - Patient should be alert and upright for all feedings (90 degrees preferred)  - Offer food and liquids at a slow rate  - No straws  - Encourage small bites of food and small sips of liquid  - Offer pills one at a time, crush or deliver with applesauce as needed  - Discontinue feeding and notify MD (or speech pathologist) if coughing or persistent throat clearing or wet/gurgly vocal quality is noted  Outcome: Progressing     Problem: HEMATOLOGIC - ADULT  Goal: Free from bleeding injury  Description: (Example usage: patient with low platelets)  INTERVENTIONS:  - Avoid intramuscular injections, enemas and rectal medication administration  - Ensure safe mobilization of patient  - Hold pressure on venipuncture sites to achieve adequate hemostasis  - Assess for signs and symptoms of internal bleeding  - Monitor lab trends  - Patient is to report abnormal signs of bleeding to staff  - Avoid use of toothpicks and dental floss  - Use electric shaver for shaving  - Use soft bristle tooth brush  - Limit straining and forceful nose blowing  Outcome: Progressing     Problem: NEUROLOGICAL - ADULT  Goal: Achieves stable or improved neurological status  Description: INTERVENTIONS  - Assess for and report changes in neurological status  - Initiate measures to prevent increased intracranial pressure  - Maintain blood pressure and fluid volume within ordered parameters to optimize cerebral perfusion and minimize risk of hemorrhage  - Monitor temperature, glucose, and sodium. Initiate  appropriate interventions as ordered  Outcome: Progressing     Problem: Patient Centered Care  Goal: Patient preferences are identified and integrated in the patient's plan of care  Description: Interventions:  - What would you like us to know as we care for you? I would like to return home.   - Provide timely, complete, and accurate information to patient/family  - Incorporate patient and family knowledge, values, beliefs, and cultural backgrounds into the planning and delivery of care  - Encourage patient/family to participate in care and decision-making at the level they choose  - Honor patient and family perspectives and choices  Outcome: Progressing     Problem: Patient/Family Goals  Goal: Patient/Family Long Term Goal  Description: Patient's Long Term Goal: Get stronger.     Interventions:  - Work with PT/OT  - See additional Care Plan goals for specific interventions  Outcome: Progressing  Goal: Patient/Family Short Term Goal  Description: Patient's Short Term Goal: Ask frequent questions.     Interventions:   - Work with staff on ways to improve overall health status.   - See additional Care Plan goals for specific interventions  Outcome: Progressing

## 2024-03-23 NOTE — PROGRESS NOTES
Currently comfortable in bed transferred to telemetry yesterday  Functional status previous history of stroke was using a cane to walk around Thanksgiving fell and had ORIF of left hip and now uses a walker.  Semiindependent.  Procedure reports are reviewed.      Vitals:    24 0700   BP: 111/64   Pulse: 62   Resp: 16   Temp: 98.2 °F (36.8 °C)       Intake/Output Summary (Last 24 hours) at 3/23/2024 0948  Last data filed at 3/23/2024 0855  Gross per 24 hour   Intake 440 ml   Output 1150 ml   Net -710 ml     Wt Readings from Last 1 Encounters:   24 154 lb 8 oz (70.1 kg)        General: No acute distress.  Neck: Jugular venous pulsations not seen.  Lungs: Crackles at bilateral bases.  Heart: Normal rate. No murmurs.  Abdomen: Soft. Non tender  Extremities: No edema.  Right and left groin are soft  Neurological: Alert. No focal deficits.  Psychiatric: Appropriate mood and affect.      Medications Prior to Admission   Medication Sig    aspirin 81 MG Oral Tab EC Take 1 tablet (81 mg total) by mouth daily.    lansoprazole 30 MG Oral Capsule Delayed Release Take 1 capsule (30 mg total) by mouth every morning before breakfast.    [] aspirin 81 MG Oral Tab EC Take 1 tablet (81 mg total) by mouth daily.    [] ticagrelor 90 MG Oral Tab Take 1 tablet (90 mg total) by mouth.    carvedilol 3.125 MG Oral Tab Take 1 tablet (3.125 mg total) by mouth 2 (two) times daily with meals.    LORazepam 0.5 MG Oral Tab Take 1 tablet (0.5 mg total) by mouth nightly.    [] losartan 25 MG Oral Tab Take 0.5 tablets (12.5 mg total) by mouth daily.    mirtazapine 30 MG Oral Tab Take 1 tablet (30 mg total) by mouth nightly.    QUEtiapine 50 MG Oral Tab Take 1 tablet (50 mg total) by mouth nightly.    tamsulosin 0.4 MG Oral Cap Take 1 capsule (0.4 mg total) by mouth nightly.    traZODone 100 MG Oral Tab Take 1 tablet (100 mg total) by mouth nightly.    nitroglycerin 0.4 MG Sublingual SL Tab Place 1 tablet (0.4 mg  total) under the tongue.     No results found.      Lab Results   Component Value Date    WBC 4.9 03/23/2024    HGB 9.6 03/23/2024    HCT 29.9 03/23/2024    .0 03/23/2024    K 4.5 03/23/2024       IMPRESSION:   CAD with LAD and SVG to LAD .  Status post chronic total occlusion revascularization of the left into descending artery.  Stenting of the left main and the first marginal branch of the circumflex coronary arteries.  chronic systolic heart failure LVEF most recent echo 30-35% with RWMA in LAD territory  CVA by remote history  Paroxysmal afib, post op no recurrence.  Remains sinus with PVCs     PLAN:      Holding parameters on carvedilol and losartan.  Continue with dual antiplatelet therapy hemoglobin stable  Start physical therapy if stable okay for discharge in 24 to 48 hours    D/w nursing staff    Hoda Drake MD  [facility] Cardiovascular services

## 2024-03-23 NOTE — PHYSICAL THERAPY NOTE
PHYSICAL THERAPY EVALUATION - INPATIENT     Room Number: 304/304-A  Evaluation Date: 3/23/2024  Type of Evaluation: Initial   Physician Order: PT Eval and Treat    Presenting Problem: CAD, s/p cath, CHF     Reason for Therapy: Mobility Dysfunction and Discharge Planning    PHYSICAL THERAPY ASSESSMENT   Patient is a 78 year old male admitted 3/19/2024 for CAD, CHF, cardiac cath.  Prior to admission, patient's baseline is MI.  Patient is currently functioning below baseline with bed mobility, transfers, gait, and stair negotiation.  Patient is requiring contact guard assist as a result of the following impairments: decreased functional strength, decreased endurance/aerobic capacity, pain, and impaired motor planning.  Physical Therapy will continue to follow for duration of hospitalization.    Patient will benefit from continued skilled PT Services at discharge to promote functional independence in home.  Anticipate patient will return home with home health PT.    PLAN  PT Treatment Plan: Bed mobility;Energy conservation;Patient education;Gait training  Rehab Potential : Fair  Frequency (Obs): 3-5x/week    PHYSICAL THERAPY MEDICAL/SOCIAL HISTORY   History related to current admission: admit with SOB, CHF.  Hx cardiac Sx 2023.       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  Gets transport with stairs.  On 1L O2 since Dec 2023.  Had HH PT recently.    SUBJECTIVE  I'm tired    PHYSICAL THERAPY EXAMINATION   OBJECTIVE  Precautions: Cardiac  Fall Risk: High fall risk    WEIGHT BEARING RESTRICTION                   PAIN ASSESSMENT  Ratin          COGNITION  Overall Cognitive Status:  WFL - within functional limits    RANGE OF MOTION AND STRENGTH ASSESSMENT  Upper  extremity ROM and strength are within functional limits see OT  Lower extremity ROM is within functional limits except for the following: BLE  Lower extremity strength is within functional limits except for the following:  BLE    BALANCE  Static Sitting: Good  Dynamic Sitting: Good  Static Standing: Fair -  Dynamic Standing: Fair -    ADDITIONAL TESTS                                    NEUROLOGICAL FINDINGS                      ACTIVITY TOLERANCE                         O2 WALK       AM-PAC '6-Clicks' INPATIENT SHORT FORM - BASIC MOBILITY  How much difficulty does the patient currently have...  Patient Difficulty: Turning over in bed (including adjusting bedclothes, sheets and blankets)?: A Little   Patient Difficulty: Sitting down on and standing up from a chair with arms (e.g., wheelchair, bedside commode, etc.): A Little   Patient Difficulty: Moving from lying on back to sitting on the side of the bed?: A Little   How much help from another person does the patient currently need...   Help from Another: Moving to and from a bed to a chair (including a wheelchair)?: A Little   Help from Another: Need to walk in hospital room?: A Little   Help from Another: Climbing 3-5 steps with a railing?: Total     AM-PAC Score:  Raw Score: 16   Approx Degree of Impairment: 54.16%   Standardized Score (AM-PAC Scale): 40.78   CMS Modifier (G-Code): CK    FUNCTIONAL ABILITY STATUS  Functional Mobility/Gait Assessment  Gait Assistance: Contact guard assist  Distance (ft): 10  Assistive Device: Rolling walker  Rolling: contact guard assist  Supine to Sit: contact guard assist  Sit to Supine: contact guard assist  Sit to Stand: contact guard assist    Exercise/Education Provided:  Bed mobility  Body mechanics  Energy conservation  Gait training  ROM  Transfer training    The patient's Approx Degree of Impairment: 54.16% has been calculated based on documentation in the [facility] '6 clicks' Inpatient Basic Mobility Short Form.  Research  supports that patients with this level of impairment may benefit from  PT with complex cardiac history, maintain independence with home ADLs.  Final disposition will be made by interdisciplinary medical team.    Patient End of Session: Up in chair;Call light within reach;Needs met;RN aware of session/findings;All patient questions and concerns addressed;Alarm set    CURRENT GOALS  Goals to be met by: 2024  Patient Goal Patient's self-stated goal is: to go home   Goal #1 Patient is able to demonstrate supine - sit EOB @ level: independent     Goal #1   Current Status    Goal #2 Patient is able to demonstrate transfers EOB to/from [facility] at assistance level: independent with none     Goal #2  Current Status    Goal #3 Patient is able to ambulate 400 feet with assist device: walker - rolling at assistance level: modified independent   Goal #3   Current Status            Goal #4 Patient to demonstrate independence with home activity/exercise instructions provided to patient in preparation for discharge.   Goal #5   Current Status    Goal #6    Goal #6  Current Status      Patient Evaluation Complexity Level:  History Moderate - 1 or 2 personal factors and/or co-morbidities   Examination of body systems Moderate - addressing a total of 3 or more elements   Clinical Presentation  Moderate - Evolving   Clinical Decision Making  Moderate Complexity     Gait Trainin minutes  Therapeutic Activity:  30 minutes  Neuromuscular Re-education: 0 minutes  Therapeutic Exercise: 0 minutes  Canalith Repositionin minutes  Manual Therapy: 0 minutes  Can add/delete any of these

## 2024-03-23 NOTE — OCCUPATIONAL THERAPY NOTE
OCCUPATIONAL THERAPY EVALUATION - INPATIENT     Room Number: 304/304-A  Evaluation Date: 3/23/2024  Type of Evaluation: Initial       Physician Order: IP Consult to Occupational Therapy  Reason for Therapy: ADL/IADL Dysfunction and Discharge Planning    OCCUPATIONAL THERAPY ASSESSMENT     Patient is a 78 year old male admitted 3/19/2024 for hospitalization for tachpynea s/p angioplasty.  Prior to admission, pt was receiving assist for ADLs, IADLs, and functional transfers at RW level from wife.  Patient is currently requiring up to max A for ADLs overall along with SBA for supine to sit, SBA for sitting at EOB, min A for STS, and min A for functional transfer at RW level. Pt tolerated about 1 minutes of standing in supported standing.  Pt has the following impairments: decreased functional strength, decreased functional reach, decreased endurance, decreased muscular endurance, and medical status.    RN cleared pt for participation in OT session, which was completed in collaboration with PT. Upon arrival, pt was supine in bed and agreeable to activity. No visitors present during session. Gait belt used. Pt was left in chair and alarm was activated. Call light and all needs left in reach. Handoff given to RN.    Education provided  Educated pt about role of OT and hospital therapy process as well as proper safety and deep breathing techniques. Pt verbalized/demonstrated good carryover.    Patient will benefit from continued skilled OT Services at discharge to promote functional independence and safety with additional support and return home with home health OT    PLAN  OT Treatment Plan: Balance activities;Energy conservation/work simplification techniques;Continued evaluation;Compensatory technique education;Fine motor coordination activities;Neuromuscluar reeducation;Equipment eval/education;Patient/Family training;Patient/Family education;Cognitive reorientation;Endurance training;UE strengthening/ROM;Functional  transfer training;Visual perceptual training;IADL training;ADL training      OCCUPATIONAL THERAPY MEDICAL/SOCIAL HISTORY     Problem List  Active Problems:    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    Past Surgical History  No past surgical history on file.    HOME SITUATION  Type of Home: House  Home Layout: One level  Lives With: Family                              SUBJECTIVE  \"My wife helps me.\"    OCCUPATIONAL THERAPY EXAMINATION      OBJECTIVE     Fall Risk: High fall risk    PAIN ASSESSMENT  Ratin             RANGE OF MOTION   Upper extremity ROM is within functional limits     STRENGTH ASSESSMENT  Upper extremity strength is within functional limits     COORDINATION  Gross Motor: WFL   Fine Motor: WFL     ACTIVITIES OF DAILY LIVING ASSESSMENT  AM-PAC ‘6-Clicks’ Inpatient Daily Activity Short Form  How much help from another person does the patient currently need…  -   Putting on and taking off regular lower body clothing?: A Lot  -   Bathing (including washing, rinsing, drying)?: A Lot  -   Toileting, which includes using toilet, bedpan or urinal? : A Little  -   Putting on and taking off regular upper body clothing?: A Little  -   Taking care of personal grooming such as brushing teeth?: A Little  -   Eating meals?: None    AM-PAC Score:  Score: 17  Approx Degree of Impairment: 50.11%  Standardized Score (AM-PAC Scale): 37.26  CMS Modifier (G-Code): CK      BED MOBILITY  Supine to Sit : Stand-by Assist      FUNCTIONAL TRANSFER ASSESSMENT  Supine to Sit : Stand-by Assist     Sit to stand: min A  Toilet Transfer: min A  Chair Transfer: min A    FUNCTIONAL ADL ASSESSMENT  Overall mod A    The patient's Approx Degree of Impairment: 50.11% has been calculated based on documentation in the [facility] '6 clicks' Inpatient Daily Activity Short Form.  Research supports that patients with this level of impairment may  benefit from home health. Final disposition will be made by interdisciplinary medical team.    OT Goals  Patients self stated goal is: to go home     Patient will complete functional transfer with SBA  Comment:     Patient will complete toileting with SBA  Comment:     Patient will tolerate standing for 3 minutes in prep for adls with SBA   Comment:               Goals  on:   Frequency: 3-5x/wk    Patient Evaluation Complexity Level:   Occupational Profile/Medical History MODERATE - Expanded review of history including review of medical or therapy record   Specific performance deficits impacting engagement in ADL/IADL MODERATE  3 - 5 performance deficits   Client Assessment/Performance Deficits MODERATE - Comorbidities and min to mod modifications of tasks    Clinical Decision Making MODERATE - Analysis of occupational profile, detailed assessments, several treatment options    Overall Complexity MODERATE     OT Session Time: 20 minutes  Self-Care Home Management: 10 minutes           Glenys Davila OT  [facility]  Inpatient Rehabilitation  Occupational Therapy  [phone]

## 2024-03-23 NOTE — SLP NOTE
SPEECH DAILY NOTE - INPATIENT    ASSESSMENT & PLAN   ASSESSMENT    Proper PPE worn. Hands sanitized upon entrance/exit Pt room.       Pt alert, afebrile and on 1L/min 02 via NC. Pt seen for swallow analysis per BSE recommendations (after consulting with RN). Pt agreed to participate. Pt's preferred method of learning verbal. Pt upright in chair; observed with current diet of soft ETC/mildly-thick liquids for monitoring diet tolerance. Additionally, Pt seen with thin liquid trials for candidacy of diet upgrade; Pt prefers to remain on soft ETC d/t upper denture only. Swallowing precautions/strategies discussed; minimal cues needed for execution. Functional bite reflex/mastication/lingual skills on soft ETC food. Oral cavity essentially clear post swallows. Pharyngeal response appeared to trigger within 1-2 sec per hyolaryngeal elevation to completion (functional rise/strength per palpation). No overt CSA on soft ETC nor mildly-thick liquids  (no coughing, no throat clearing, clear vocal quality and no SOB). Pt again (second day) with immediate aggressive coughing S/P controlled thin liquid trials. Pt v/u for no upgrade of diet and declined thickening education as Pt stated \"I know all about that thick liquid stuff.\" Collaborated with RN regarding Pt's swallowing plan of care. No report of difficulty taking meds. No new CXR completed. Sp02 ~98% during this session. Call light within Pt's reach upon SLP discharge from room.        CXR 3/21/24:  Findings and impression:  Tip of the PICC at the superior cavoatrial junction    Stable patchy opacity at the left base    Normal heart size with coronary stents, AVR; no edema           PLAN:    To f/u x1 sessions to ensure safe intake of diet and reinforce swallowing/aspiration precautions. To monitor for new CXR results. To f/u with thickening product education with family as available.      Diet Recommendations - Solids: Soft/ Easy to chew  Diet Recommendations - Liquids:  Nectar thick liquids/ Mildly thick    Compensatory Strategies Recommended: No straws;Slow rate;Alternate consistencies;Small bites and sips  Aspiration Precautions: Upright position;Slow rate;Small bites and sips;No straw  Medication Administration Recommendations: Whole in puree    Patient Experiencing Pain: No  Treatment Plan  Treatment Plan/Recommendations: Aspiration precautions  Interdisciplinary Communication: Discussed with RN  GOALS  Goal #1 The patient will tolerate easy to chew consistency and mildly thick liquids without overt signs or symptoms of aspiration with 100 % accuracy over 2 session(s).    No CSA on current diet.        In Progress   Goal #2 The patient/family/caregiver will demonstrate understanding and implementation of aspiration precautions and swallow strategies independently over 3 session(s).    Swallow precautions/diet discussed with spouse. V/u.     In Progress   Goal #3 The patient will tolerate trial upgrade of regular (if dentures present) consistency and thin liquids without overt signs or symptoms of aspiration with 90 % accuracy over 3 session(s).    No diet upgrade during this session; prefers soft ETC and aggressive coughing controlled thin liquids.     In Progress   Goal #4 The patient will utilize compensatory strategies as outlined by  BSSE (clinical evaluation) including Slow rate, Small bites, Small sips, Alternate liquids/solids, No straws, Upright 90 degrees with minimal assistance 100 % of the time across 2 sessions.    Minimal cues for small sips/bites.     In Progress   FOLLOW UP  Follow Up Needed (Documentation Required): Yes  SLP Follow-up Date: 03/24/24  Number of Visits to Meet Established Goals: 3    Session: 2    If you have any questions, please contact   Johnna Bahena M.S. [facility]/SLP  Speech-Language Pathologist  [facility]  #[id number]

## 2024-03-24 LAB
HCT VFR BLD AUTO: 31.3 %
HGB BLD-MCNC: 10.3 G/DL

## 2024-03-24 PROCEDURE — 99232 SBSQ HOSP IP/OBS MODERATE 35: CPT | Performed by: INTERNAL MEDICINE

## 2024-03-24 PROCEDURE — 99233 SBSQ HOSP IP/OBS HIGH 50: CPT | Performed by: HOSPITALIST

## 2024-03-24 RX ORDER — LOSARTAN POTASSIUM 25 MG/1
12.5 TABLET ORAL DAILY
Status: SHIPPED | COMMUNITY
Start: 2024-03-24

## 2024-03-24 RX ORDER — MELATONIN
325
Qty: 30 TABLET | Refills: 0 | Status: SHIPPED | OUTPATIENT
Start: 2024-03-24

## 2024-03-24 RX ORDER — MIDODRINE HYDROCHLORIDE 5 MG/1
5 TABLET ORAL 3 TIMES DAILY
Status: DISCONTINUED | OUTPATIENT
Start: 2024-03-24 | End: 2024-03-25

## 2024-03-24 RX ADMIN — PANTOPRAZOLE SODIUM 40 MG: 40 TABLET, DELAYED RELEASE ORAL at 05:49:00

## 2024-03-24 RX ADMIN — MIDODRINE HYDROCHLORIDE 5 MG: 5 TABLET ORAL at 12:07:00

## 2024-03-24 RX ADMIN — MIDODRINE HYDROCHLORIDE 5 MG: 5 TABLET ORAL at 17:17:00

## 2024-03-24 RX ADMIN — MELATONIN 325 MG: at 08:00:00

## 2024-03-24 RX ADMIN — HEPARIN SODIUM 5000 UNITS: 5000 INJECTION, SOLUTION INTRAVENOUS; SUBCUTANEOUS at 22:03:00

## 2024-03-24 RX ADMIN — LORAZEPAM 0.5 MG: 0.5 TABLET ORAL at 22:03:00

## 2024-03-24 RX ADMIN — MIRTAZAPINE 30 MG: 7.5 TABLET, FILM COATED ORAL at 22:03:00

## 2024-03-24 RX ADMIN — ASPIRIN 81 MG: 81 TABLET ORAL at 09:30:00

## 2024-03-24 RX ADMIN — HEPARIN SODIUM 5000 UNITS: 5000 INJECTION, SOLUTION INTRAVENOUS; SUBCUTANEOUS at 09:06:00

## 2024-03-24 RX ADMIN — ACETAMINOPHEN 650 MG: 325 TABLET ORAL at 22:03:00

## 2024-03-24 NOTE — PROGRESS NOTES
[facility]  part of [facility]    Progress Note      Assessment and Plan:   1.  Acute on chronic respiratory failure-patient has COPD exacerbated by CHF.  Doing better clinically now.  Now down to 1/2 L nasal cannula oxygen.  Status post PCI.    Recommendations:  1.  Okay to home from pulmonary perspective with supplemental oxygen at baseline.  2.  Nebulizer as needed.    2.  Severe cardiomyopathy status post CABG with severe MR and chronic A-fib status post NSTEMI and stenting.    Recommendations: As per cardiology    3.  Anemia-had blood from cath site.  Doing well now.  Hemoglobin is 10.3.    Subjective:   Gurdeep Sagastume is a(n) 78 year old male who is breathing comfortably    Objective:   Blood pressure 104/60, pulse 66, temperature 97.3 °F (36.3 °C), temperature source Oral, resp. rate 18, height 5' 11\" (1.803 m), weight 154 lb 6.4 oz (70 kg), SpO2 100%.    Physical Exam alert white male  HEENT examination is unremarkable with pupils equal round and reactive to light and accommodation.   Neck without adenopathy, thyromegaly, JVD nor bruit.   Lungs diminished to auscultation and percussion.  Cardiac regular rate and rhythm no murmur.   Abdomen nontender, without hepatosplenomegaly and no mass appreciable.   Extremities without clubbing cyanosis nor edema.   Neurologic grossly intact with symmetric tone and strength and reflex.  Skin without gross abnormality     Results:     Lab Results   Component Value Date    HGB 10.3 03/24/2024    HCT 31.3 03/24/2024       Carlos Martines MD  Medical Director, Critical Care, [facility]  Medical Director, [facility]  Pager: [phone]

## 2024-03-24 NOTE — PHYSICAL THERAPY NOTE
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    Patient continues to function below baseline with bed mobility.  Contributing factors to remaining limitations include decreased functional strength, decreased endurance/aerobic capacity, and pain.  Next session anticipate patient to progress bed mobility, transfers, and gait.  Physical Therapy will continue to follow patient for duration of hospitalization.    Patient continues to benefit from continued skilled PT services: at discharge to promote functional independence in home.  Anticipate patient will return home with home health PT.    PLAN  PT Treatment Plan: Bed mobility;Body mechanics;Endurance;Gait training  Frequency (Obs): 3-5x/week    SUBJECTIVE  LIMITED PARTICIPATION    OBJECTIVE  Precautions: Cardiac    WEIGHT BEARING RESTRICTION                PAIN ASSESSMENT   Ratin          BALANCE  Static Sitting: Good  Dynamic Sitting: Good  Static Standing: Fair -  Dynamic Standing: Fair -    ACTIVITY TOLERANCE                          O2 WALK       AM-PAC '6-Clicks' INPATIENT SHORT FORM - BASIC MOBILITY  How much difficulty does the patient currently have...  Patient Difficulty: Turning over in bed (including adjusting bedclothes, sheets and blankets)?: A Little   Patient Difficulty: Sitting down on and standing up from a chair with arms (e.g., wheelchair, bedside commode, etc.): A Little   Patient Difficulty: Moving from lying on back to sitting on the side of the bed?: A Little   How much help from another person does the patient currently need...   Help from Another: Moving to and from a bed to a chair (including a wheelchair)?: A Little   Help from Another:  Need to walk in hospital room?: A Little   Help from Another: Climbing 3-5 steps with a railing?: Total     AM-PAC Score:  Raw Score: 16   Approx Degree of Impairment: 54.16%   Standardized Score (AM-PAC Scale): 40.78   CMS Modifier (G-Code): CK    FUNCTIONAL ABILITY STATUS  Functional Mobility/Gait Assessment  Gait Assistance: Not tested  Distance (ft): 10  Assistive Device: Rolling walker  Rolling: moderate assist  Supine to Sit:   Sit to Supine:   Sit to Stand:     Additional information: Pt seen daily.Pt refused OOB.PT RX limited to bed mobility,positioning for pt comfort as well as ther ex.Pt educated on deep breathing,relaxation as well as energy conservation technique.    The patient's Approx Degree of Impairment: 54.16% has been calculated based on documentation in the [facility] '6 clicks' Inpatient Daily Activity Short Form.  Research supports that patients with this level of impairment may benefit from Home with Home Health PT.  Final disposition will be made by interdisciplinary medical team.    THERAPEUTIC EXERCISES  Lower Extremity Ankle pumps  Glut sets  Quad sets     Position Supine       Patient End of Session: Up in chair    CURRENT GOALS   Patient Goal Patient's self-stated goal is: to go home   Goal #1 Patient is able to demonstrate supine - sit EOB @ level: independent     Goal #1   Current Status Mod a   Goal #2 Patient is able to demonstrate transfers EOB to/from [facility] at assistance level: independent with none     Goal #2  Current Status NT   Goal #3 Patient is able to ambulate 400 feet with assist device: walker - rolling at assistance level: modified independent   Goal #3   Current Status NT           Goal #4 Patient to demonstrate independence with home activity/exercise instructions provided to patient in preparation for discharge.   Goal #5   Current Status In progress   Goal #6    Goal #6  Current Status      Gait Training:  minutes  Therapeutic Activity: 20 minutes  Neuromuscular  Re-education:  minutes  Therapeutic Exercise: 10 minutes  Canalith Repositioning:  minutes  Manual Therapy:  minutes  Can add/delete any of these

## 2024-03-24 NOTE — PLAN OF CARE
Patient resting well overnight. Wilder asked this RN for night meds so he could sleep. Plan for patient to discharge home when able.      Problem: CARDIOVASCULAR - ADULT  Goal: Maintains optimal cardiac output and hemodynamic stability  Description: INTERVENTIONS:  - Monitor vital signs, rhythm, and trends  - Monitor for bleeding, hypotension and signs of decreased cardiac output  - Evaluate effectiveness of vasoactive medications to optimize hemodynamic stability  - Monitor arterial and/or venous puncture sites for bleeding and/or hematoma  - Assess quality of pulses, skin color and temperature  - Assess for signs of decreased coronary artery perfusion - ex. Angina  - Evaluate fluid balance, assess for edema, trend weights  Outcome: Progressing  Goal: Absence of cardiac arrhythmias or at baseline  Description: INTERVENTIONS:  - Continuous cardiac monitoring, monitor vital signs, obtain 12 lead EKG if indicated  - Evaluate effectiveness of antiarrhythmic and heart rate control medications as ordered  - Initiate emergency measures for life threatening arrhythmias  - Monitor electrolytes and administer replacement therapy as ordered  Outcome: Progressing     Problem: RESPIRATORY - ADULT  Goal: Achieves optimal ventilation and oxygenation  Description: INTERVENTIONS:  - Assess for changes in respiratory status  - Assess for changes in mentation and behavior  - Position to facilitate oxygenation and minimize respiratory effort  - Oxygen supplementation based on oxygen saturation or ABGs  - Provide Smoking Cessation handout, if applicable  - Encourage broncho-pulmonary hygiene including cough, deep breathe, Incentive Spirometry  - Assess the need for suctioning and perform as needed  - Assess and instruct to report SOB or any respiratory difficulty  - Respiratory Therapy support as indicated  - Manage/alleviate anxiety  - Monitor for signs/symptoms of CO2 retention  Outcome: Progressing     Problem: GENITOURINARY -  ADULT  Goal: Absence of urinary retention  Description: INTERVENTIONS:  - Assess patient’s ability to void and empty bladder  - Monitor intake/output and perform bladder scan as needed  - Follow urinary retention protocol/standard of care  - Consider collaborating with pharmacy to review patient's medication profile  - Implement strategies to promote bladder emptying  Outcome: Progressing     Problem: Impaired Swallowing  Goal: Minimize aspiration risk  Description: Interventions:  - Patient should be alert and upright for all feedings (90 degrees preferred)  - Offer food and liquids at a slow rate  - No straws  - Encourage small bites of food and small sips of liquid  - Offer pills one at a time, crush or deliver with applesauce as needed  - Discontinue feeding and notify MD (or speech pathologist) if coughing or persistent throat clearing or wet/gurgly vocal quality is noted  Outcome: Progressing     Problem: HEMATOLOGIC - ADULT  Goal: Free from bleeding injury  Description: (Example usage: patient with low platelets)  INTERVENTIONS:  - Avoid intramuscular injections, enemas and rectal medication administration  - Ensure safe mobilization of patient  - Hold pressure on venipuncture sites to achieve adequate hemostasis  - Assess for signs and symptoms of internal bleeding  - Monitor lab trends  - Patient is to report abnormal signs of bleeding to staff  - Avoid use of toothpicks and dental floss  - Use electric shaver for shaving  - Use soft bristle tooth brush  - Limit straining and forceful nose blowing  Outcome: Progressing     Problem: NEUROLOGICAL - ADULT  Goal: Achieves stable or improved neurological status  Description: INTERVENTIONS  - Assess for and report changes in neurological status  - Initiate measures to prevent increased intracranial pressure  - Maintain blood pressure and fluid volume within ordered parameters to optimize cerebral perfusion and minimize risk of hemorrhage  - Monitor temperature,  glucose, and sodium. Initiate appropriate interventions as ordered  Outcome: Progressing     Problem: Patient Centered Care  Goal: Patient preferences are identified and integrated in the patient's plan of care  Description: Interventions:  - What would you like us to know as we care for you? \"I prefer the juice and not the thickened water.\"  - Provide timely, complete, and accurate information to patient/family  - Incorporate patient and family knowledge, values, beliefs, and cultural backgrounds into the planning and delivery of care  - Encourage patient/family to participate in care and decision-making at the level they choose  - Honor patient and family perspectives and choices  Outcome: Progressing     Problem: Patient/Family Goals  Goal: Patient/Family Long Term Goal  Description: Patient's Long Term Goal: stay out of the hospital    Interventions:  - continue to follow up with physicians  - follow md orders  - take medications  - watch for more bleeding  - See additional Care Plan goals for specific interventions  Outcome: Progressing  Goal: Patient/Family Short Term Goal  Description: Patient's Short Term Goal: to go home    Interventions:   - Monitor labs  - monitor cath sites  - See additional Care Plan goals for specific interventions  Outcome: Progressing

## 2024-03-24 NOTE — PROGRESS NOTES
Currently comfortable in bed     Functional status previous history of stroke was using a cane to walk around Thanksgiving fell and had ORIF of left hip and now uses a walker.  Semiindependent.  Procedure reports are reviewed.      Review of systems:  12 point systems were discussed with the patient    Vitals:    24 0548   BP: 103/61   Pulse: 66   Resp: 18   Temp: 97.2 °F (36.2 °C)       Intake/Output Summary (Last 24 hours) at 3/24/2024 0848  Last data filed at 3/24/2024 0554  Gross per 24 hour   Intake 248 ml   Output 1050 ml   Net -802 ml     Wt Readings from Last 1 Encounters:   24 154 lb 6.4 oz (70 kg)        General: No acute distress.  Neck: Jugular venous pulsations not seen.  Lungs: Crackles at bilateral bases.  Heart: Normal rate. No murmurs.  Abdomen: Soft. Non tender  Extremities: No edema.  Right and left groin are soft  Neurological: Alert. No focal deficits.  Psychiatric: Appropriate mood and affect.      Medications Prior to Admission   Medication Sig    aspirin 81 MG Oral Tab EC Take 1 tablet (81 mg total) by mouth daily.    lansoprazole 30 MG Oral Capsule Delayed Release Take 1 capsule (30 mg total) by mouth every morning before breakfast.    [] aspirin 81 MG Oral Tab EC Take 1 tablet (81 mg total) by mouth daily.    carvedilol 3.125 MG Oral Tab Take 1 tablet (3.125 mg total) by mouth 2 (two) times daily with meals.    LORazepam 0.5 MG Oral Tab Take 1 tablet (0.5 mg total) by mouth nightly.    mirtazapine 30 MG Oral Tab Take 1 tablet (30 mg total) by mouth nightly.    QUEtiapine 50 MG Oral Tab Take 1 tablet (50 mg total) by mouth nightly.    tamsulosin 0.4 MG Oral Cap Take 1 capsule (0.4 mg total) by mouth nightly.    traZODone 100 MG Oral Tab Take 1 tablet (100 mg total) by mouth nightly.    [DISCONTINUED] ticagrelor 90 MG Oral Tab Take 1 tablet (90 mg total) by mouth.    [DISCONTINUED] losartan 25 MG Oral Tab Take 0.5 tablets (12.5 mg total) by mouth daily.     nitroglycerin 0.4 MG Sublingual SL Tab Place 1 tablet (0.4 mg total) under the tongue.     No results found.             IMPRESSION:   CAD with LAD and SVG to LAD .  Status post chronic total occlusion revascularization of the left into descending artery.  Stenting of the left main and the first marginal branch of the circumflex coronary arteries.  chronic systolic heart failure LVEF most recent echo 30-35% with RWMA in LAD territory  CVA by remote history  Paroxysmal afib, post op no recurrence.  Remains sinus with PVCs     PLAN:      Holding parameters on carvedilol and losartan.  Continue with dual antiplatelet therapy hemoglobin stable  Start physical therapy if stable okay for midodrine 3 times a day for low blood pressure  If asymptomatic DC tomorrow    Hoda Drake MD  [facility] Cardiovascular services

## 2024-03-24 NOTE — PROGRESS NOTES
[facility]  Progress Note     Gurdeep Sagastume  : 1945    Status: Inpatient  Day #: 5    Attending: Royer Null MD  PCP: No primary care provider on file.     Assessment and Plan:    CAD  H/o CABG and AVR  Post-op Hypotension with pulmonary edema  -lasix IV given  -s/p complex intervention to chronic total occlusion of LAD via saphenous vein graft  -weaned off levophed  -cont asa, brilinta   -coreg, losartan - held due to holding parameters  -midodrine prn  -off IVF    Bleeding from cath site  Acute blood loss anemia  -bleeding stopped  -hgb improved  -started iron pill    Ischemic cardiomyopathy  Acute systolic CHF  Acute on chronic hypoxic respiratory failure  -s/p lasix IV  -monitor    Other:  H/o HTN   BPH  CKD stage 3  Paroxysmal afib - no need to resume xarelto per cardiology    Dispo:  PT/OT eval. Discharge planning, hopefully tomorrow.       DVT Mechanical Prophylaxis:   SCDs,    DVT Pharmacologic Prophylaxis   Medication    heparin (Porcine) 5000 UNIT/ML injection 5,000 Units         DVT Pharmacologic prophylaxis: Aspirin 81 mg     Subjective:      Initial Chief Complaint:  low BP post angiogram    No CP, no SOB. No dizziness. Trouble sleeping    10 point ROS completed and was negative, except for pertinent positive and negatives stated in subjective.      Objective:      Temp:  [97.2 °F (36.2 °C)-98 °F (36.7 °C)] 97.3 °F (36.3 °C)  Pulse:  [66-73] 66  Resp:  [16-20] 18  BP: (103-107)/(51-71) 104/60  SpO2:  [94 %-100 %] 100 %  General:  Alert, no distress  HEENT:  Normocephalic, atraumatic  Cardiac:  Regular rate, regular rhythm  Pulmonary:  Clear to auscultation bilaterally, respirations unlabored  Gastrointestinal:  Soft, non-tender, normal bowel sounds  Musculoskeletal:  No joint swelling  Extremities:  No edema, no cyanosis, no clubbing  Neurologic:  nonfocal  Psychiatric:  Normal affect, calm and appropriate    Intake/Output Summary (Last 24 hours) at 3/24/2024 1127  Last data  filed at 3/24/2024 0554  Gross per 24 hour   Intake 128 ml   Output 1050 ml   Net -922 ml         Recent Labs   Lab 03/19/24  2121 03/20/24  0449 03/21/24  0547 03/22/24  0500 03/23/24  0715 03/24/24  1049   WBC 18.5* 15.8* 8.1 5.3 4.9  --    HGB 14.0 12.6* 9.0* 8.3* 9.6* 10.3*   HCT 44.1 39.6 27.5* 26.2* 29.9* 31.3*   .0 280.0 171.0 122.0* 142.0*  --    RBC 4.70 4.28 3.03* 2.82* 3.25*  --    MCV 93.8 92.5 90.8 92.9 92.0  --    MCH 29.8 29.4 29.7 29.4 29.5  --    MCHC 31.7 31.8 32.7 31.7 32.1  --    RDW 18.9* 18.7* 18.6* 18.6* 19.0*  --    NEPRELIM 16.23* 13.83* 5.13  --   --   --      Recent Labs   Lab 03/20/24  0449 03/21/24  0547 03/22/24  0500 03/23/24  0715   BUN 23 23 19  --    CREATSERUM 1.35* 1.22 1.03  --    CA 9.5 8.3* 8.3*  --     139 140  --    K 4.8 3.9 3.7 4.5    109 112  --    CO2 21.0 23.0 23.0  --    * 86 69*  --        No results found.      Medications:   midodrine  5 mg Oral TID    mirtazapine  30 mg Oral Nightly    LORazepam  0.5 mg Oral Nightly    ferrous sulfate  325 mg Oral Daily with breakfast    heparin  5,000 Units Subcutaneous 2 times per day    aspirin  81 mg Oral Daily    carvedilol  3.125 mg Oral BID with meals    losartan  12.5 mg Oral Daily    ticagrelor  90 mg Oral BID    pantoprazole  40 mg Oral QAM AC      PRN Meds: acetaminophen, lidocaine, ipratropium-albuterol, ondansetron    Supplementary Documentation:        MDM High. Time spent on chart/note review, review of labs/imaging, discussion with patient, physical exam, discussion with staff, consultants, coordinating care, writing progress note, discussion of plan of care.      Royer Null MD

## 2024-03-24 NOTE — PLAN OF CARE
Pt alert and oriented times 4, denies pain. Pt tolerating whole medications w/ nectar thick fluids. Poor nutritional intake today, pt refusing to order lunch but did drink a mighty shake. Wife was able to help him eat his dinner. Therapy done at bed side. Pt refused to get up to his chair today, agreed to get up with Pt in the morning.     Problem: CARDIOVASCULAR - ADULT  Goal: Maintains optimal cardiac output and hemodynamic stability  Description: INTERVENTIONS:  - Monitor vital signs, rhythm, and trends  - Monitor for bleeding, hypotension and signs of decreased cardiac output  - Evaluate effectiveness of vasoactive medications to optimize hemodynamic stability  - Monitor arterial and/or venous puncture sites for bleeding and/or hematoma  - Assess quality of pulses, skin color and temperature  - Assess for signs of decreased coronary artery perfusion - ex. Angina  - Evaluate fluid balance, assess for edema, trend weights  Outcome: Progressing  Goal: Absence of cardiac arrhythmias or at baseline  Description: INTERVENTIONS:  - Continuous cardiac monitoring, monitor vital signs, obtain 12 lead EKG if indicated  - Evaluate effectiveness of antiarrhythmic and heart rate control medications as ordered  - Initiate emergency measures for life threatening arrhythmias  - Monitor electrolytes and administer replacement therapy as ordered  Outcome: Progressing     Problem: RESPIRATORY - ADULT  Goal: Achieves optimal ventilation and oxygenation  Description: INTERVENTIONS:  - Assess for changes in respiratory status  - Assess for changes in mentation and behavior  - Position to facilitate oxygenation and minimize respiratory effort  - Oxygen supplementation based on oxygen saturation or ABGs  - Provide Smoking Cessation handout, if applicable  - Encourage broncho-pulmonary hygiene including cough, deep breathe, Incentive Spirometry  - Assess the need for suctioning and perform as needed  - Assess and instruct to report SOB  or any respiratory difficulty  - Respiratory Therapy support as indicated  - Manage/alleviate anxiety  - Monitor for signs/symptoms of CO2 retention  Outcome: Progressing     Problem: GENITOURINARY - ADULT  Goal: Absence of urinary retention  Description: INTERVENTIONS:  - Assess patient’s ability to void and empty bladder  - Monitor intake/output and perform bladder scan as needed  - Follow urinary retention protocol/standard of care  - Consider collaborating with pharmacy to review patient's medication profile  - Implement strategies to promote bladder emptying  Outcome: Progressing     Problem: Impaired Swallowing  Goal: Minimize aspiration risk  Description: Interventions:  - Patient should be alert and upright for all feedings (90 degrees preferred)  - Offer food and liquids at a slow rate  - No straws  - Encourage small bites of food and small sips of liquid  - Offer pills one at a time, crush or deliver with applesauce as needed  - Discontinue feeding and notify MD (or speech pathologist) if coughing or persistent throat clearing or wet/gurgly vocal quality is noted  Outcome: Progressing     Problem: HEMATOLOGIC - ADULT  Goal: Free from bleeding injury  Description: (Example usage: patient with low platelets)  INTERVENTIONS:  - Avoid intramuscular injections, enemas and rectal medication administration  - Ensure safe mobilization of patient  - Hold pressure on venipuncture sites to achieve adequate hemostasis  - Assess for signs and symptoms of internal bleeding  - Monitor lab trends  - Patient is to report abnormal signs of bleeding to staff  - Avoid use of toothpicks and dental floss  - Use electric shaver for shaving  - Use soft bristle tooth brush  - Limit straining and forceful nose blowing  Outcome: Progressing     Problem: NEUROLOGICAL - ADULT  Goal: Achieves stable or improved neurological status  Description: INTERVENTIONS  - Assess for and report changes in neurological status  - Initiate measures  to prevent increased intracranial pressure  - Maintain blood pressure and fluid volume within ordered parameters to optimize cerebral perfusion and minimize risk of hemorrhage  - Monitor temperature, glucose, and sodium. Initiate appropriate interventions as ordered  Outcome: Progressing     Problem: Patient Centered Care  Goal: Patient preferences are identified and integrated in the patient's plan of care  Description: Interventions:  - What would you like us to know as we care for you? \"I prefer the juice and not the thickened water.\"  - Provide timely, complete, and accurate information to patient/family  - Incorporate patient and family knowledge, values, beliefs, and cultural backgrounds into the planning and delivery of care  - Encourage patient/family to participate in care and decision-making at the level they choose  - Honor patient and family perspectives and choices  Outcome: Progressing     Problem: Patient/Family Goals  Goal: Patient/Family Long Term Goal  Description: Patient's Long Term Goal: stay out of the hospital    Interventions:  - continue to follow up with physicians  - follow md orders  - take medications  - watch for more bleeding  - See additional Care Plan goals for specific interventions  Outcome: Progressing  Goal: Patient/Family Short Term Goal  Description: Patient's Short Term Goal: to go home    Interventions:   - Monitor labs  - monitor cath sites  - See additional Care Plan goals for specific interventions  Outcome: Progressing

## 2024-03-25 VITALS
WEIGHT: 148.63 LBS | TEMPERATURE: 98 F | RESPIRATION RATE: 20 BRPM | HEIGHT: 71 IN | SYSTOLIC BLOOD PRESSURE: 111 MMHG | DIASTOLIC BLOOD PRESSURE: 68 MMHG | BODY MASS INDEX: 20.81 KG/M2 | OXYGEN SATURATION: 97 % | HEART RATE: 71 BPM

## 2024-03-25 LAB
ANION GAP SERPL CALC-SCNC: 5 MMOL/L (ref 0–18)
BUN BLD-MCNC: 10 MG/DL (ref 9–23)
BUN/CREAT SERPL: 9.7 (ref 10–20)
CALCIUM BLD-MCNC: 9.1 MG/DL (ref 8.7–10.4)
CHLORIDE SERPL-SCNC: 110 MMOL/L (ref 98–112)
CO2 SERPL-SCNC: 25 MMOL/L (ref 21–32)
CREAT BLD-MCNC: 1.03 MG/DL
EGFRCR SERPLBLD CKD-EPI 2021: 75 ML/MIN/1.73M2 (ref 60–?)
GLUCOSE BLD-MCNC: 81 MG/DL (ref 70–99)
HCT VFR BLD AUTO: 30.5 %
HGB BLD-MCNC: 9.9 G/DL
OSMOLALITY SERPL CALC.SUM OF ELEC: 288 MOSM/KG (ref 275–295)
POTASSIUM SERPL-SCNC: 4.2 MMOL/L (ref 3.5–5.1)
SODIUM SERPL-SCNC: 140 MMOL/L (ref 136–145)

## 2024-03-25 PROCEDURE — 99232 SBSQ HOSP IP/OBS MODERATE 35: CPT | Performed by: PHYSICIAN ASSISTANT

## 2024-03-25 PROCEDURE — 99239 HOSP IP/OBS DSCHRG MGMT >30: CPT | Performed by: HOSPITALIST

## 2024-03-25 RX ORDER — ATORVASTATIN CALCIUM 40 MG/1
40 TABLET, FILM COATED ORAL NIGHTLY
Qty: 90 TABLET | Refills: 1 | Status: SHIPPED | OUTPATIENT
Start: 2024-03-25

## 2024-03-25 RX ORDER — ATORVASTATIN CALCIUM 40 MG/1
40 TABLET, FILM COATED ORAL NIGHTLY
Status: DISCONTINUED | OUTPATIENT
Start: 2024-03-25 | End: 2024-03-25

## 2024-03-25 RX ADMIN — PANTOPRAZOLE SODIUM 40 MG: 40 TABLET, DELAYED RELEASE ORAL at 07:59:00

## 2024-03-25 RX ADMIN — ASPIRIN 81 MG: 81 TABLET ORAL at 08:57:00

## 2024-03-25 RX ADMIN — HEPARIN SODIUM 5000 UNITS: 5000 INJECTION, SOLUTION INTRAVENOUS; SUBCUTANEOUS at 08:57:00

## 2024-03-25 RX ADMIN — MIDODRINE HYDROCHLORIDE 5 MG: 5 TABLET ORAL at 07:59:00

## 2024-03-25 RX ADMIN — MELATONIN 325 MG: at 08:57:00

## 2024-03-25 RX ADMIN — MIDODRINE HYDROCHLORIDE 5 MG: 5 TABLET ORAL at 12:16:00

## 2024-03-25 NOTE — PLAN OF CARE
Wilder resting well tonight. This RN talked with him regarding physical therapy. Patient told this RN, \"I will get up in the morning when they come, I know they want to check my blood pressure then too.\" Plan for patient to discharge home when cleared.    Problem: CARDIOVASCULAR - ADULT  Goal: Maintains optimal cardiac output and hemodynamic stability  Description: INTERVENTIONS:  - Monitor vital signs, rhythm, and trends  - Monitor for bleeding, hypotension and signs of decreased cardiac output  - Evaluate effectiveness of vasoactive medications to optimize hemodynamic stability  - Monitor arterial and/or venous puncture sites for bleeding and/or hematoma  - Assess quality of pulses, skin color and temperature  - Assess for signs of decreased coronary artery perfusion - ex. Angina  - Evaluate fluid balance, assess for edema, trend weights  Outcome: Progressing  Goal: Absence of cardiac arrhythmias or at baseline  Description: INTERVENTIONS:  - Continuous cardiac monitoring, monitor vital signs, obtain 12 lead EKG if indicated  - Evaluate effectiveness of antiarrhythmic and heart rate control medications as ordered  - Initiate emergency measures for life threatening arrhythmias  - Monitor electrolytes and administer replacement therapy as ordered  Outcome: Progressing     Problem: RESPIRATORY - ADULT  Goal: Achieves optimal ventilation and oxygenation  Description: INTERVENTIONS:  - Assess for changes in respiratory status  - Assess for changes in mentation and behavior  - Position to facilitate oxygenation and minimize respiratory effort  - Oxygen supplementation based on oxygen saturation or ABGs  - Provide Smoking Cessation handout, if applicable  - Encourage broncho-pulmonary hygiene including cough, deep breathe, Incentive Spirometry  - Assess the need for suctioning and perform as needed  - Assess and instruct to report SOB or any respiratory difficulty  - Respiratory Therapy support as indicated  -  Manage/alleviate anxiety  - Monitor for signs/symptoms of CO2 retention  Outcome: Progressing     Problem: GENITOURINARY - ADULT  Goal: Absence of urinary retention  Description: INTERVENTIONS:  - Assess patient’s ability to void and empty bladder  - Monitor intake/output and perform bladder scan as needed  - Follow urinary retention protocol/standard of care  - Consider collaborating with pharmacy to review patient's medication profile  - Implement strategies to promote bladder emptying  Outcome: Progressing     Problem: Impaired Swallowing  Goal: Minimize aspiration risk  Description: Interventions:  - Patient should be alert and upright for all feedings (90 degrees preferred)  - Offer food and liquids at a slow rate  - No straws  - Encourage small bites of food and small sips of liquid  - Offer pills one at a time, crush or deliver with applesauce as needed  - Discontinue feeding and notify MD (or speech pathologist) if coughing or persistent throat clearing or wet/gurgly vocal quality is noted  Outcome: Progressing     Problem: HEMATOLOGIC - ADULT  Goal: Free from bleeding injury  Description: (Example usage: patient with low platelets)  INTERVENTIONS:  - Avoid intramuscular injections, enemas and rectal medication administration  - Ensure safe mobilization of patient  - Hold pressure on venipuncture sites to achieve adequate hemostasis  - Assess for signs and symptoms of internal bleeding  - Monitor lab trends  - Patient is to report abnormal signs of bleeding to staff  - Avoid use of toothpicks and dental floss  - Use electric shaver for shaving  - Use soft bristle tooth brush  - Limit straining and forceful nose blowing  Outcome: Progressing     Problem: NEUROLOGICAL - ADULT  Goal: Achieves stable or improved neurological status  Description: INTERVENTIONS  - Assess for and report changes in neurological status  - Initiate measures to prevent increased intracranial pressure  - Maintain blood pressure and  fluid volume within ordered parameters to optimize cerebral perfusion and minimize risk of hemorrhage  - Monitor temperature, glucose, and sodium. Initiate appropriate interventions as ordered  Outcome: Progressing     Problem: Patient Centered Care  Goal: Patient preferences are identified and integrated in the patient's plan of care  Description: Interventions:  - What would you like us to know as we care for you? \"I prefer the juice and not the thickened water.\"  - Provide timely, complete, and accurate information to patient/family  - Incorporate patient and family knowledge, values, beliefs, and cultural backgrounds into the planning and delivery of care  - Encourage patient/family to participate in care and decision-making at the level they choose  - Honor patient and family perspectives and choices  Outcome: Progressing     Problem: Patient/Family Goals  Goal: Patient/Family Long Term Goal  Description: Patient's Long Term Goal: stay out of the hospital    Interventions:  - continue to follow up with physicians  - follow md orders  - take medications  - watch for more bleeding  - See additional Care Plan goals for specific interventions  Outcome: Progressing  Goal: Patient/Family Short Term Goal  Description: Patient's Short Term Goal: to go home    Interventions:   - Monitor labs  - monitor cath sites  - See additional Care Plan goals for specific interventions  Outcome: Progressing

## 2024-03-25 NOTE — PROGRESS NOTES
[facility]  part of [facility]    Progress Note    Gurdeep Sagastume Patient Status:  Inpatient    1945 MRN [id number]   Location [facility] 3W/SW Attending Royer Null MD   Hosp Day # 6 PCP No primary care provider on file.     Subjective:   Seen and examined while sitting in chair. No complaints. No shortness of breath or chest pain. On 1 L O2.    Objective:   Blood pressure 99/64, pulse 67, temperature 97.8 °F (36.6 °C), temperature source Oral, resp. rate 18, height 5' 11\" (1.803 m), weight 148 lb 9.6 oz (67.4 kg), SpO2 99%.  Physical Exam  Vitals and nursing note reviewed.   Constitutional:       General: He is awake. He is not in acute distress.     Appearance: He is not ill-appearing.      Interventions: Nasal cannula in place.   HENT:      Head: Normocephalic and atraumatic.   Cardiovascular:      Rate and Rhythm: Normal rate and regular rhythm.   Pulmonary:      Effort: No respiratory distress.      Breath sounds: No wheezing, rhonchi or rales.   Musculoskeletal:      Cervical back: Normal range of motion and neck supple.      Right lower leg: No edema.      Left lower leg: No edema.   Skin:     General: Skin is warm and dry.   Neurological:      General: No focal deficit present.      Mental Status: He is alert and oriented to person, place, and time.   Psychiatric:         Mood and Affect: Mood normal.         Behavior: Behavior is cooperative.       Results:   Lab Results   Component Value Date    WBC 4.9 2024    HGB 9.9 (L) 2024    HCT 30.5 (L) 2024    .0 (L) 2024    CREATSERUM 1.03 2024    BUN 10 2024     2024    K 4.2 2024     2024    CO2 25.0 2024    GLU 81 2024    CA 9.1 2024       Assessment & Plan:   Acute on chronic respiratory failure  Due to CAD, CHF, underlying COPD  Oxygenation improved - on 1 L O2 (baseline)  Plan:  -O2  -Okay to discharge from pulmonary  perspective    Severe ischemic cardiomyopathy  H/o CABG  Echo with LVEF 30-35% with RWMA  S/p PCI 3/19  Off pressors - started on midodrine for hypotension  Plan:  -Brilinta and aspirin  -Carvedilol on hold for hypotension  -Midodrine  -As per cardiology    COPD  No signs of acute exacerbation  Plan:  -Nebs as needed    Dysphagia  Video swallow study 1/2024 (Jim) - aspiration with thin liquids  Speech following - thickened liquids  Plan:  -Aspiration precautions  -Modified diet per speech therapy    DVT prophylaxis: Subcutaneous heparin    Respiratory status improved. Will sign off.    Ryan Moore PA-C  Pulmonary Medicine  3/25/2024

## 2024-03-25 NOTE — PAYOR COMM NOTE
--------------  CONTINUED STAY REVIEW    Payor: ZAY LAMBERT  Subscriber #:  [id number]  Authorization Number: [id number]    Admit date: 3/19/24  Admit time:  5:53 PM    3/23/24   Temp:  [97.6 °F (36.4 °C)-98.2 °F (36.8 °C)] 98.2 °F (36.8 °C)  Pulse:  [62-76] 62  Resp:  [16-27] 16  BP: ()/(55-72) 111/64  SpO2:  [96 %-99 %] 98 %    Lab 03/19/24 2121 03/20/24 0449 03/21/24  0547 03/22/24  0500 03/23/24  0715   WBC 18.5* 15.8* 8.1 5.3 4.9   HGB 14.0 12.6* 9.0* 8.3* 9.6*   HCT 44.1 39.6 27.5* 26.2* 29.9*   .0 280.0 171.0 122.0* 142.0*   RBC 4.70 4.28 3.03* 2.82* 3.25*   MCV 93.8 92.5 90.8 92.9 92.0   MCH 29.8 29.4 29.7 29.4 29.5   MCHC 31.7 31.8 32.7 31.7 32.1   RDW 18.9* 18.7* 18.6* 18.6* 19.0*   NEPRELIM 16.23* 13.83* 5.13  --   --       Lab 03/20/24 0449 03/21/24  0547 03/22/24  0500 03/23/24  0715   BUN 23 23 19  --    CREATSERUM 1.35* 1.22 1.03  --    CA 9.5 8.3* 8.3*  --     139 140  --    K 4.8 3.9 3.7 4.5       Assessment and Plan:  CAD  H/o CABG and AVR  Post-op Hypotension with pulmonary edema  -s/p complex intervention to chronic total occlusion of LAD via saphenous vein graft  -weaned off levophed  -cont asa, brilinta, coreg, losartan  -lasix IV given  -off IVF     Bleeding from cath site  Acute blood loss anemia  -bleeding stopped  -hgb improved  -started iron pill     Ischemic cardiomyopathy  Acute systolic CHF  Acute on chronic hypoxic respiratory failure  -s/p lasix IV  -monitor     Other:  H/o HTN   BPH  CKD stage 3  Paroxysmal afib - no need to resume xarelto per cardiology           3/24/24  Lab 03/19/24  2121 03/20/24  0449 03/21/24  0547 03/22/24  0500 03/23/24  0715 03/24/24  1049   WBC 18.5* 15.8* 8.1 5.3 4.9  --    HGB 14.0 12.6* 9.0* 8.3* 9.6* 10.3*   HCT 44.1 39.6 27.5* 26.2* 29.9* 31.3*     Assessment and Plan:     CAD  H/o CABG and AVR  Post-op Hypotension with pulmonary edema  -lasix IV given  -s/p complex intervention to chronic total occlusion of LAD via saphenous vein  graft  -weaned off levophed  -cont asa, brilinta   -coreg, losartan - held due to holding parameters  -midodrine prn  -off IVF     Bleeding from cath site  Acute blood loss anemia  -bleeding stopped  -hgb improved  -started iron pill     Ischemic cardiomyopathy  Acute systolic CHF  Acute on chronic hypoxic respiratory failure  -s/p lasix IV  -monitor     Other:  H/o HTN   BPH  CKD stage 3  Paroxysmal afib - no need to resume xarelto per cardiology      CARDIOLOGY  IMPRESSION:   CAD with LAD and SVG to LAD .  Status post chronic total occlusion revascularization of the left into descending artery.  Stenting of the left main and the first marginal branch of the circumflex coronary arteries.  chronic systolic heart failure LVEF most recent echo 30-35% with RWMA in LAD territory  CVA by remote history  Paroxysmal afib, post op no recurrence.  Remains sinus with PVCs      PLAN:      Holding parameters on carvedilol and losartan.  Continue with dual antiplatelet therapy hemoglobin stable  Start physical therapy if stable okay for midodrine 3 times a day for low blood pressure      MEDICATIONS ADMINISTERED IN LAST 1 DAY:  acetaminophen (Tylenol) tab 650 mg       Date Action Dose Route User    3/24/2024 2203 Given 650 mg Oral Charis Davila RN          aspirin DR tab 81 mg       Date Action Dose Route User    3/25/2024 0857 Given 81 mg Oral Live Griffin RN          ferrous sulfate DR tab 325 mg       Date Action Dose Route User    3/25/2024 0857 Given 325 mg Oral Live Griffin RN          heparin (Porcine) 5000 UNIT/ML injection 5,000 Units       Date Action Dose Route User    3/25/2024 0857 Given 5,000 Units Subcutaneous (Left Lower Abdomen) Live Griffin RN    3/24/2024 2203 Given 5,000 Units Subcutaneous (Right Lower Abdomen) Charis Davila RN          LORazepam (Ativan) tab 0.5 mg       Date Action Dose Route User    3/24/2024 2203 Given 0.5 mg Oral Charis Davila RN          midodrine (ProAmatine) tab 5 mg        Date Action Dose Route User    3/25/2024 1216 Given 5 mg Oral Cat Cardona RN    3/25/2024 0759 Given 5 mg Oral Live Griffin RN    3/24/2024 1717 Given 5 mg Oral Live Griffin RN          mirtazapine (Remeron) tab 30 mg       Date Action Dose Route User    3/24/2024 2203 Given 30 mg Oral Charis Davila RN          pantoprazole (Protonix) DR tab 40 mg       Date Action Dose Route User    3/25/2024 0759 Given 40 mg Oral Live Griffin RN          ticagrelor (Brilinta) tab 90 mg       Date Action Dose Route User    3/25/2024 0857 Given 90 mg Oral Live Griffin RN    3/24/2024 2203 Given 90 mg Oral Charis Davila RN            Vitals (last day)       Date/Time Temp Pulse Resp BP SpO2 Weight O2 Device O2 Flow Rate (L/min) [facility]    03/25/24 1121 -- 71 20 111/68 97 % -- Nasal cannula 1 L/min     03/25/24 0854 -- 67 18 99/64 99 % -- None (Room air) 1 L/min     03/25/24 0756 97.8 °F (36.6 °C) 65 16 110/59 97 % -- None (Room air) --     03/25/24 0404 97.3 °F (36.3 °C) 66 18 104/62 96 % 148 lb 9.6 oz None (Room air) --     03/24/24 2152 98 °F (36.7 °C) 61 18 103/57 99 % -- Nasal cannula 1 L/min     03/24/24 0903 97.3 °F (36.3 °C) 66 18 104/60 100 % -- Nasal cannula 1 L/min     03/24/24 0548 97.2 °F (36.2 °C) 66 18 103/61 100 % 154 lb 6.4 oz Nasal cannula 1 L/min

## 2024-03-25 NOTE — PHYSICAL THERAPY NOTE
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    Patient continues to function near baseline with bed mobility, transfers, and gait.  Contributing factors to remaining limitations include decreased endurance/aerobic capacity, decreased muscular endurance, and medical status.  Next session anticipate patient to progress transfers and gait.  Physical Therapy will continue to follow patient for duration of hospitalization.    Patient continues to benefit from continued skilled PT services: at discharge to promote functional independence and safety with additional support and return home with home health PT.    PLAN  PT Treatment Plan: Bed mobility;Body mechanics;Endurance;Gait training  Frequency (Obs): 3-5x/week    SUBJECTIVE  \"I should be going home today, hopefully before noon.\"    OBJECTIVE  Precautions: Cardiac    WEIGHT BEARING RESTRICTION  none    PAIN ASSESSMENT   Ratin  Location: denies       BALANCE  Static Sitting: Good  Dynamic Sitting: Good  Static Standing: Good  Dynamic Standing: Fair +    ACTIVITY TOLERANCE  Pulse: 66 (at rest, 76 with activity)  Heart Rate Source: Monitor     BP: 103/64  BP Location: Right arm  BP Method: Automatic  Patient Position: Sitting     O2 WALK  Oxygen Therapy  SPO2% on Oxygen at Rest: 100  At rest oxygen flow (liters per minute): 1  SPO2% Ambulation on Oxygen: 98  Ambulation oxygen flow (liters per minute): 1    AM-PAC '6-Clicks' INPATIENT SHORT FORM - BASIC MOBILITY  How much difficulty does the patient currently have...  Patient Difficulty: Turning over in bed (including adjusting bedclothes, sheets and blankets)?: None   Patient Difficulty: Sitting down on and standing  up from a chair with arms (e.g., wheelchair, bedside commode, etc.): A Little   Patient Difficulty: Moving from lying on back to sitting on the side of the bed?: None   How much help from another person does the patient currently need...   Help from Another: Moving to and from a bed to a chair (including a wheelchair)?: A Little   Help from Another: Need to walk in hospital room?: A Little   Help from Another: Climbing 3-5 steps with a railing?: A Lot     AM-PAC Score:  Raw Score: 19   Approx Degree of Impairment: 41.77%   Standardized Score (AM-PAC Scale): 45.44   CMS Modifier (G-Code): CK    FUNCTIONAL ABILITY STATUS  Functional Mobility/Gait Assessment  Gait Assistance: Contact guard assist  Distance (ft): 32'  Assistive Device: Rolling walker  Pattern: L Circumduction (LLE external rotation with decreased knee and hip flexion, no LOB)  Stairs: Stairs  How Many Stairs: not tested, pt does not perform stairs at baseline, has transport assist  Rolling: independent  Sit to Supine: independent  Sit to Stand: minimal assist - from bedside chair    Additional information: Pt tolerating short household distances using rolling walker, reports supportive wife who uses gait belt at baseline. Patient received seated in bedside chair, agreeable to physical therapy. Vital signs monitored as noted above, no adverse symptoms and patient stable during session.     Education with patient provided verbally on physiological benefits of out of bed mobility, home safety, and energy conservation/activity pacing. Patient with good carryover during session. Anticipated therapy needs remain appropriate based on the patient's performance, personal factors, and remaining functional impairments.    The patient's Approx Degree of Impairment: 41.77% has been calculated based on documentation in the [facility] '6 clicks' Inpatient Daily Activity Short Form.  Research supports that patients with this level of impairment may benefit from home-health  PT.  Final disposition will be made by interdisciplinary medical team.      Patient End of Session: In bed;Needs met;Call light within reach;RN aware of session/findings;All patient questions and concerns addressed    CURRENT GOALS   Goals to be met by: 4/5/2024  Patient Goal Patient's self-stated goal is: to go home   Goal #1 Patient is able to demonstrate supine - sit EOB @ level: independent      Goal #1   Current Status  MET 3/25/24   Goal #2 Patient is able to demonstrate transfers EOB to/from [facility] at assistance level: independent with none      Goal #2  Current Status  NOT MET/IN PROGRESS    Goal #3 Patient is able to ambulate 400 feet with assist device: walker - rolling at assistance level: modified independent   Goal #3   Current Status  NOT MET/IN PROGRESS                Goal #4 Patient to demonstrate independence with home activity/exercise instructions provided to patient in preparation for discharge.   Goal #5   Current Status  IN PROGRESS/ONGOING    Goal #6     Goal #6  Current Status       Gait Training: 10 minutes      Dayana Quick, PT, DPT  [facility]  [id number]

## 2024-03-25 NOTE — CM/SW NOTE
03/22/24 1300   CM/SW Referral Data   Referral Source Social Work (self-referral)   Reason for Referral Discharge planning   Informant EMR   Medical Hx   Does patient have an established PCP? Yes  (Bridgett Hand)   Patient Info   Patient's Current Mental Status at Time of Assessment Alert;Oriented   Patient's Home Environment House   Number of Levels in Home 1   Number of Stair in Home 4 to enter   Patient lives with Spouse/Significant other   Patient Status Prior to Admission   Independent with ADLs and Mobility No   Pt. requires assistance with Housework;Driving;Ambulating   Services in place prior to admission Home Health Care;DME/Supplies at home   Home Health Provider Info [facility]   DME Provider/Supplier [employer]   Type of DME/Supplies Straight Cane;Standard Walker;Commode;Shower Chair;Home Oxygen   Discharge Needs   Anticipated D/C needs Home health care;To be determined       SW self referred pt for DC Planning. Above assessment completed based on chart review and notes from [facility].    Per chart, pt typically follows at [facility].    Pt is from home w/ spouse. Single level home w/ 4 steps to enter. Pt has DME listed above.     Per chart, pt wears 1L O2 at home via [employer]. Called [facility] to confirm - pending response.    Upon review, pt is current w/ [facility]. SW spoke to [facility] and confirmed pt is active for RN, PT/OT. Clinicals sent to [facility] via [facility]. F2F entered/sent.    UPDATE: Per [facility]opra - pt's home O2 orders are 2L at rest and 3L w/ exertion.     PLAN: Home w/ [facility] - pending clinical course & needs       SW/CM to remain available for support and/or discharge planning.         Meghan Montaño, MSW, LSW [id number]    
   03/25/24 1300   Discharge disposition   Expected discharge disposition Home-Health   Post Acute Care Provider   (Jim [facility])   Discharge transportation Private car       Per chart, pt has DC order for today.    SW spoke to Leatha frazier/ Jim TS via phone - informed her of pt's DC today.      Pt is cleared from SW/CM stand point.    PLAN: Home w/ Jim Montaño, MSW, LSW [id number]          
Pt discussed with nursing, Pt is from home and lives with his spouse.   Pt post-op Angio gram, bleeding noted to surgical site, pt on Levo and 2LPM O2 via NC.  Per nursing pt has 2L O2 via NC at baseline.     Per chart review pt is current with , agency unknown.    CM/SW team to continue to follow.     Bridgett Núñez RN, BSN  Nurse   [phone]    
no

## 2024-03-25 NOTE — DISCHARGE SUMMARY
[facility]  Discharge Summary     Gurdeep Sagastume  : 1945    Status: Inpatient  Day #: 6    Attending: Royer Null MD  PCP: No primary care provider on file.     Date of Admission:  3/19/2024  Date of Discharge:  3/25/2024     Hospital Discharge Diagnoses:     CAD  H/o CABG and AVR  Post-op Hypotension with pulmonary edema  Bleeding from cath site  Acute blood loss anemia  Ischemic cardiomyopathy  Acute systolic CHF  Acute on chronic hypoxic respiratory failure  HTN   BPH  CKD stage 3  Paroxysmal afib      History of Present Illness:     Copied from Admission H&P:    The patient is a 78-year-old  male who was recently hospitalized at [facility] with non-ST-elevation myocardial infarction. He is known to have underlying ischemic cardiomyopathy. He was found to have left ventricular apical thrombus, ejection fraction 35%, with severe mitral regurgitation. He had an attempted intervention at that time. Cardiac angiogram found chronic total occlusion of LAD graft and total LAD occlusion. Patient was discharged in a stable condition. He had another angiogram done on  and had percutaneous transluminal angioplasty of the saphenous vein graft to the left anterior descending. Today, he was brought in for chronic total occlusion LAD intervention. Post procedure, he had some pulmonary edema, was given 1 dose of IV Lasix. Also, noted to have a drop in his blood pressure and started on IV pressors and transferred to CCU for further close monitoring.       Hospital Course:     CAD  H/o CABG and AVR  Post-op Hypotension with pulmonary edema  -lasix IV given  -s/p complex intervention to chronic total occlusion of LAD via saphenous vein graft  -weaned off levophed  -cont asa, brilinta   -coreg, losartan - held due to holding parameters  -midodrine prn  -off IVF     Bleeding from cath site  Acute blood loss anemia  -bleeding stopped  -hgb improved  -started iron pill     Ischemic  cardiomyopathy  Acute systolic CHF  Acute on chronic hypoxic respiratory failure  -s/p lasix IV  -monitor     Other:  H/o HTN   BPH  CKD stage 3  Paroxysmal afib - no need to resume xarelto per cardiology     Consultants         Provider   Role Specialty     Ghada Levine MD  Consulting Physician HOSPITALIST     Greta Ribeiro MD  Consulting Physician PULMONARY DISEASES     Vishnu Radford DO  Consulting Physician PULMONARY DISEASES             Physical Exam:   Blood pressure 111/68, pulse 71, temperature 97.8 °F (36.6 °C), temperature source Oral, resp. rate 20, height 5' 11\" (1.803 m), weight 148 lb 9.6 oz (67.4 kg), SpO2 97%.  General:  Alert, no distress  HEENT:  Normocephalic, atraumatic  Cardiac:  Regular rate, regular rhythm  Pulmonary:  Clear to auscultation bilaterally, respirations unlabored  Gastrointestinal:  Soft, non-tender, normal bowel sounds  Musculoskeletal:  No joint swelling  Extremities:  No edema, no cyanosis, no clubbing  Neurologic:  nonfocal  Psychiatric:  Normal affect, calm and appropriate         Discharge Medications        START taking these medications        Instructions Prescription details   atorvastatin 40 MG Tabs  Commonly known as: Lipitor      Take 1 tablet (40 mg total) by mouth nightly.   Quantity: 90 tablet  Refills: 1     ferrous sulfate 325 (65 FE) MG Tbec      Take 1 tablet (325 mg total) by mouth daily with breakfast.   Quantity: 30 tablet  Refills: 0            CHANGE how you take these medications        Instructions Prescription details   aspirin 81 MG Tbec  What changed: Another medication with the same name was removed. Continue taking this medication, and follow the directions you see here.      Take 1 tablet (81 mg total) by mouth daily.   Quantity: 30 tablet  Refills: 1            CONTINUE taking these medications        Instructions Prescription details   carvedilol 3.125 MG Tabs  Commonly known as: Coreg      Take 1 tablet (3.125 mg total) by mouth 2  (two) times daily with meals.   Refills: 0     lansoprazole 30 MG Cpdr  Commonly known as: Prevacid      Take 1 capsule (30 mg total) by mouth every morning before breakfast.   Refills: 0     LORazepam 0.5 MG Tabs  Commonly known as: Ativan      Take 1 tablet (0.5 mg total) by mouth nightly.   Refills: 0     losartan 25 MG Tabs  Commonly known as: Cozaar      Take 0.5 tablets (12.5 mg total) by mouth daily.   Refills: 0     mirtazapine 30 MG Tabs  Commonly known as: Remeron      Take 1 tablet (30 mg total) by mouth nightly.   Refills: 0     nitroglycerin 0.4 MG Subl  Commonly known as: Nitrostat      Place 1 tablet (0.4 mg total) under the tongue.   Refills: 0     QUEtiapine 50 MG Tabs  Commonly known as: SEROquel      Take 1 tablet (50 mg total) by mouth nightly.   Refills: 0     tamsulosin 0.4 MG Caps  Commonly known as: Flomax      Take 1 capsule (0.4 mg total) by mouth nightly.   Refills: 0     ticagrelor 90 MG Tabs  Commonly known as: Brilinta      Take 1 tablet (90 mg total) by mouth.   Refills: 0     traZODone 100 MG Tabs  Commonly known as: Desyrel      Take 1 tablet (100 mg total) by mouth nightly.   Refills: 0               Where to Get Your Medications        These medications were sent to [id number] - [place], [place] - [address] AT [facility], [phone], [phone] [address] [facility] [address]      Phone: [phone]   atorvastatin 40 MG Tabs  ferrous sulfate 325 (65 FE) MG Tbec        Follow-up Information       Eladio Glass, DO Follow up in 1 week(s).    Specialties: Cardiovascular Diseases, CARDIOLOGY  Contact information:  [address]  [address]  [facility] [address] [phone]                             Hospital Discharge Diagnoses:  CAD    Lace+ Score: 34  59-90 High Risk  29-58 Medium Risk  0-28   Low Risk.    TCM Follow-Up Recommendation:  LACE 29-58: Moderate Risk of readmission after discharge from the hospital.        I spent >30  minutes on this discharge. Discussed treatment and discharge plans.       Royer Null MD

## 2024-03-25 NOTE — PROGRESS NOTES
Currently comfortable in bed     Functional status previous history of stroke was using a cane to walk around Thanksgiving fell and had ORIF of left hip and now uses a walker.  Semiindependent.  Procedure reports are reviewed.  Patient walked with walker  No cardiac c/o  HR&BP stable      Review of systems:  12 point systems were discussed with the patient    Vitals:    24 1121   BP: 111/68   Pulse: 71   Resp: 20   Temp:        Intake/Output Summary (Last 24 hours) at 3/25/2024 1233  Last data filed at 3/25/2024 0414  Gross per 24 hour   Intake 420 ml   Output 1150 ml   Net -730 ml     Wt Readings from Last 1 Encounters:   24 148 lb 9.6 oz (67.4 kg)        General: No acute distress.  Neck: Jugular venous pulsations not seen.  Lungs: Crackles at bilateral bases.  Heart: Normal rate. No murmurs.  Abdomen: Soft. Non tender  Extremities: No edema.  Right and left groin are soft  Neurological: Alert. No focal deficits.  Psychiatric: Appropriate mood and affect.      Medications Prior to Admission   Medication Sig    aspirin 81 MG Oral Tab EC Take 1 tablet (81 mg total) by mouth daily.    lansoprazole 30 MG Oral Capsule Delayed Release Take 1 capsule (30 mg total) by mouth every morning before breakfast.    [] aspirin 81 MG Oral Tab EC Take 1 tablet (81 mg total) by mouth daily.    carvedilol 3.125 MG Oral Tab Take 1 tablet (3.125 mg total) by mouth 2 (two) times daily with meals.    LORazepam 0.5 MG Oral Tab Take 1 tablet (0.5 mg total) by mouth nightly.    mirtazapine 30 MG Oral Tab Take 1 tablet (30 mg total) by mouth nightly.    QUEtiapine 50 MG Oral Tab Take 1 tablet (50 mg total) by mouth nightly.    tamsulosin 0.4 MG Oral Cap Take 1 capsule (0.4 mg total) by mouth nightly.    traZODone 100 MG Oral Tab Take 1 tablet (100 mg total) by mouth nightly.    [DISCONTINUED] ticagrelor 90 MG Oral Tab Take 1 tablet (90 mg total) by mouth.    [DISCONTINUED] losartan 25 MG Oral Tab Take 0.5 tablets  (12.5 mg total) by mouth daily.    nitroglycerin 0.4 MG Sublingual SL Tab Place 1 tablet (0.4 mg total) under the tongue.     No results found.      Lab Results   Component Value Date    HGB 9.9 03/25/2024    HCT 30.5 03/25/2024    CREATSERUM 1.03 03/25/2024    BUN 10 03/25/2024     03/25/2024    K 4.2 03/25/2024     03/25/2024    CO2 25.0 03/25/2024    GLU 81 03/25/2024    CA 9.1 03/25/2024     ECHO:  Conclusions:     1. Left ventricle: The cavity size was increased. Wall thickness was normal.      Systolic function was moderately to markedly reduced. The estimated      ejection fraction was 30-35%, by visual assessment. Hypokinesis of the      apical wall. Hypokinesis of the anterolateral wall. Hypokinesis of the      anterior wall. Hypokinesis of the anteroseptal wall. Doppler parameters      are consistent with abnormal left ventricular relaxation - grade 1      diastolic dysfunction.   2. Right ventricle: Systolic function was reduced.   3. Left atrium: The left atrial volume was normal.   4. Aortic valve: A bioprosthetic valve is present. The peak systolic      velocity was 1.62m/sec. The mean systolic gradient was 6mm Hg. The valve      area (VTI) was 2.05cm^2. The valve area (VTI) index was 1.09cm^2/m^2.   5. 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   6. Mitral valve: There was mild regurgitation.   Impressions:  This study is compared with previous dated 01/13/2024:     IMPRESSION:   CAD with LAD and SVG to LAD .  Status post chronic total occlusion revascularization of the left into descending artery.  Stenting of the left main and the first marginal branch of the circumflex coronary arteries.  chronic systolic heart failure LVEF most recent echo 30-35% with RWMA in LAD territory  CVA by remote history  Paroxysmal afib, post op no recurrence.  Remains sinus with PVCs     PLAN:      Holding parameters on carvedilol and losartan.  Continue with dual antiplatelet  therapy hemoglobin stable  Start physical therapy if stable okay for midodrine 3 times a day for low blood pressure as needed    Stable from cardiac standpoint    Cardiac meds:  ASA 81 mg  Coreg 3.125 mg q 12  Losartan 12.5 mg  Brillanta 90 mg q 12  Will add Atorvastatin 40 mg daily  Midodrine as needed-now discontinued  Heparin sub q @ DVT prophylactic dose    D/W patient   Patient's BP stable  Will stop midodrine   Continue ASA 81 + Brillanta 90 mg q 12 x 1 year post CRIS (up to 03/25)      D/W patient and nursing staff      Jensen Duarte MD  [facility] Cardiovascular services

## 2024-03-25 NOTE — PLAN OF CARE
Pt alert and oriented times 4, denies pain. Pt up in chair this morning.       Problem: CARDIOVASCULAR - ADULT  Goal: Maintains optimal cardiac output and hemodynamic stability  Description: INTERVENTIONS:  - Monitor vital signs, rhythm, and trends  - Monitor for bleeding, hypotension and signs of decreased cardiac output  - Evaluate effectiveness of vasoactive medications to optimize hemodynamic stability  - Monitor arterial and/or venous puncture sites for bleeding and/or hematoma  - Assess quality of pulses, skin color and temperature  - Assess for signs of decreased coronary artery perfusion - ex. Angina  - Evaluate fluid balance, assess for edema, trend weights  Outcome: Progressing  Goal: Absence of cardiac arrhythmias or at baseline  Description: INTERVENTIONS:  - Continuous cardiac monitoring, monitor vital signs, obtain 12 lead EKG if indicated  - Evaluate effectiveness of antiarrhythmic and heart rate control medications as ordered  - Initiate emergency measures for life threatening arrhythmias  - Monitor electrolytes and administer replacement therapy as ordered  Outcome: Progressing     Problem: RESPIRATORY - ADULT  Goal: Achieves optimal ventilation and oxygenation  Description: INTERVENTIONS:  - Assess for changes in respiratory status  - Assess for changes in mentation and behavior  - Position to facilitate oxygenation and minimize respiratory effort  - Oxygen supplementation based on oxygen saturation or ABGs  - Provide Smoking Cessation handout, if applicable  - Encourage broncho-pulmonary hygiene including cough, deep breathe, Incentive Spirometry  - Assess the need for suctioning and perform as needed  - Assess and instruct to report SOB or any respiratory difficulty  - Respiratory Therapy support as indicated  - Manage/alleviate anxiety  - Monitor for signs/symptoms of CO2 retention  Outcome: Progressing     Problem: GENITOURINARY - ADULT  Goal: Absence of urinary retention  Description:  INTERVENTIONS:  - Assess patient’s ability to void and empty bladder  - Monitor intake/output and perform bladder scan as needed  - Follow urinary retention protocol/standard of care  - Consider collaborating with pharmacy to review patient's medication profile  - Implement strategies to promote bladder emptying  Outcome: Progressing     Problem: Impaired Swallowing  Goal: Minimize aspiration risk  Description: Interventions:  - Patient should be alert and upright for all feedings (90 degrees preferred)  - Offer food and liquids at a slow rate  - No straws  - Encourage small bites of food and small sips of liquid  - Offer pills one at a time, crush or deliver with applesauce as needed  - Discontinue feeding and notify MD (or speech pathologist) if coughing or persistent throat clearing or wet/gurgly vocal quality is noted  Outcome: Progressing     Problem: HEMATOLOGIC - ADULT  Goal: Free from bleeding injury  Description: (Example usage: patient with low platelets)  INTERVENTIONS:  - Avoid intramuscular injections, enemas and rectal medication administration  - Ensure safe mobilization of patient  - Hold pressure on venipuncture sites to achieve adequate hemostasis  - Assess for signs and symptoms of internal bleeding  - Monitor lab trends  - Patient is to report abnormal signs of bleeding to staff  - Avoid use of toothpicks and dental floss  - Use electric shaver for shaving  - Use soft bristle tooth brush  - Limit straining and forceful nose blowing  Outcome: Progressing     Problem: NEUROLOGICAL - ADULT  Goal: Achieves stable or improved neurological status  Description: INTERVENTIONS  - Assess for and report changes in neurological status  - Initiate measures to prevent increased intracranial pressure  - Maintain blood pressure and fluid volume within ordered parameters to optimize cerebral perfusion and minimize risk of hemorrhage  - Monitor temperature, glucose, and sodium. Initiate appropriate interventions  as ordered  Outcome: Progressing     Problem: Patient Centered Care  Goal: Patient preferences are identified and integrated in the patient's plan of care  Description: Interventions:  - What would you like us to know as we care for you? \"I prefer the juice and not the thickened water.\"  - Provide timely, complete, and accurate information to patient/family  - Incorporate patient and family knowledge, values, beliefs, and cultural backgrounds into the planning and delivery of care  - Encourage patient/family to participate in care and decision-making at the level they choose  - Honor patient and family perspectives and choices  Outcome: Progressing     Problem: Patient/Family Goals  Goal: Patient/Family Long Term Goal  Description: Patient's Long Term Goal: stay out of the hospital    Interventions:  - continue to follow up with physicians  - follow md orders  - take medications  - watch for more bleeding  - See additional Care Plan goals for specific interventions  Outcome: Progressing  Goal: Patient/Family Short Term Goal  Description: Patient's Short Term Goal: to go home    Interventions:   - Monitor labs  - monitor cath sites  - See additional Care Plan goals for specific interventions  Outcome: Progressing

## 2024-03-26 ENCOUNTER — PATIENT OUTREACH (OUTPATIENT)
Dept: CASE MANAGEMENT | Age: 79
End: 2024-03-26

## 2024-03-26 NOTE — PROGRESS NOTES
TCM chart review.  No TCM as patient follows with outside [facility] PCP.  Encounter closing.

## 2024-03-26 NOTE — PAYOR COMM NOTE
--------------  DISCHARGE REVIEW    Payor: [facility]  Subscriber #:  [id number]  Authorization Number: [id number]    Admit date: 3/19/24  Admit time:   5:53 PM  Discharge Date: 3/25/2024  3:17 PM     Admitting Physician: Eladio Glass DO  Attending Physician:  No att. providers found  Primary Care Physician: Bridgett Hand MD          Discharge Summary Notes        Discharge Summary signed by Royer Null MD at 3/25/2024  2:26 PM       Author: Royer Null MD Specialty: HOSPITALIST, HOSPITALIST Author Type: Physician    Filed: 3/25/2024  2:26 PM Date of Service: 3/25/2024  2:25 PM Status: Signed    : Royer Null MD (Physician)         [facility]  Discharge Summary     Gurdeep Sagastume  : 1945    Status: Inpatient  Day #: 6    Attending: Royer Null MD  PCP: No primary care provider on file.     Date of Admission:  3/19/2024  Date of Discharge:  3/25/2024     Hospital Discharge Diagnoses:     CAD  H/o CABG and AVR  Post-op Hypotension with pulmonary edema  Bleeding from cath site  Acute blood loss anemia  Ischemic cardiomyopathy  Acute systolic CHF  Acute on chronic hypoxic respiratory failure  HTN   BPH  CKD stage 3  Paroxysmal afib      History of Present Illness:     Copied from Admission H&P:    The patient is a 78-year-old  male who was recently hospitalized at [facility] with non-ST-elevation myocardial infarction. He is known to have underlying ischemic cardiomyopathy. He was found to have left ventricular apical thrombus, ejection fraction 35%, with severe mitral regurgitation. He had an attempted intervention at that time. Cardiac angiogram found chronic total occlusion of LAD graft and total LAD occlusion. Patient was discharged in a stable condition. He had another angiogram done on  and had percutaneous transluminal angioplasty of the saphenous vein graft to the left anterior descending. Today, he was brought in for chronic total occlusion LAD  intervention. Post procedure, he had some pulmonary edema, was given 1 dose of IV Lasix. Also, noted to have a drop in his blood pressure and started on IV pressors and transferred to CCU for further close monitoring.       Hospital Course:     CAD  H/o CABG and AVR  Post-op Hypotension with pulmonary edema  -lasix IV given  -s/p complex intervention to chronic total occlusion of LAD via saphenous vein graft  -weaned off levophed  -cont asa, brilinta   -coreg, losartan - held due to holding parameters  -midodrine prn  -off IVF     Bleeding from cath site  Acute blood loss anemia  -bleeding stopped  -hgb improved  -started iron pill     Ischemic cardiomyopathy  Acute systolic CHF  Acute on chronic hypoxic respiratory failure  -s/p lasix IV  -monitor     Other:  H/o HTN   BPH  CKD stage 3  Paroxysmal afib - no need to resume xarelto per cardiology     Consultants         Provider   Role Specialty     Ghada Levine MD  Consulting Physician HOSPITALIST     Greta Ribeiro MD  Consulting Physician PULMONARY DISEASES     Vishnu Radford DO  Consulting Physician PULMONARY DISEASES             Physical Exam:   Blood pressure 111/68, pulse 71, temperature 97.8 °F (36.6 °C), temperature source Oral, resp. rate 20, height 5' 11\" (1.803 m), weight 148 lb 9.6 oz (67.4 kg), SpO2 97%.  General:  Alert, no distress  HEENT:  Normocephalic, atraumatic  Cardiac:  Regular rate, regular rhythm  Pulmonary:  Clear to auscultation bilaterally, respirations unlabored  Gastrointestinal:  Soft, non-tender, normal bowel sounds  Musculoskeletal:  No joint swelling  Extremities:  No edema, no cyanosis, no clubbing  Neurologic:  nonfocal  Psychiatric:  Normal affect, calm and appropriate         Discharge Medications        START taking these medications        Instructions Prescription details   atorvastatin 40 MG Tabs  Commonly known as: Lipitor      Take 1 tablet (40 mg total) by mouth nightly.   Quantity: 90 tablet  Refills: 1      ferrous sulfate 325 (65 FE) MG Tbec      Take 1 tablet (325 mg total) by mouth daily with breakfast.   Quantity: 30 tablet  Refills: 0            CHANGE how you take these medications        Instructions Prescription details   aspirin 81 MG Tbec  What changed: Another medication with the same name was removed. Continue taking this medication, and follow the directions you see here.      Take 1 tablet (81 mg total) by mouth daily.   Quantity: 30 tablet  Refills: 1            CONTINUE taking these medications        Instructions Prescription details   carvedilol 3.125 MG Tabs  Commonly known as: Coreg      Take 1 tablet (3.125 mg total) by mouth 2 (two) times daily with meals.   Refills: 0     lansoprazole 30 MG Cpdr  Commonly known as: Prevacid      Take 1 capsule (30 mg total) by mouth every morning before breakfast.   Refills: 0     LORazepam 0.5 MG Tabs  Commonly known as: Ativan      Take 1 tablet (0.5 mg total) by mouth nightly.   Refills: 0     losartan 25 MG Tabs  Commonly known as: Cozaar      Take 0.5 tablets (12.5 mg total) by mouth daily.   Refills: 0     mirtazapine 30 MG Tabs  Commonly known as: Remeron      Take 1 tablet (30 mg total) by mouth nightly.   Refills: 0     nitroglycerin 0.4 MG Subl  Commonly known as: Nitrostat      Place 1 tablet (0.4 mg total) under the tongue.   Refills: 0     QUEtiapine 50 MG Tabs  Commonly known as: SEROquel      Take 1 tablet (50 mg total) by mouth nightly.   Refills: 0     tamsulosin 0.4 MG Caps  Commonly known as: Flomax      Take 1 capsule (0.4 mg total) by mouth nightly.   Refills: 0     ticagrelor 90 MG Tabs  Commonly known as: Brilinta      Take 1 tablet (90 mg total) by mouth.   Refills: 0     traZODone 100 MG Tabs  Commonly known as: Desyrel      Take 1 tablet (100 mg total) by mouth nightly.   Refills: 0               Where to Get Your Medications        These medications were sent to [id number] - [facility] [address] AT  [address], [phone], [phone] [address] [facility] [address]      Phone: [phone]   atorvastatin 40 MG Tabs  ferrous sulfate 325 (65 FE) MG Tbec        Follow-up Information       Eladio Glass, DO Follow up in 1 week(s).    Specialties: Cardiovascular Diseases, CARDIOLOGY  Contact information:  [address]  [address]  [facility] [address] [phone]                             Hospital Discharge Diagnoses:  CAD    Lace+ Score: 34  59-90 High Risk  29-58 Medium Risk  0-28   Low Risk.    TCM Follow-Up Recommendation:  LACE 29-58: Moderate Risk of readmission after discharge from the hospital.        I spent >30 minutes on this discharge. Discussed treatment and discharge plans.       Royer Null MD      Electronically signed by Royer Null MD on 3/25/2024  2:26 PM         REVIEWER COMMENTS

## (undated) NOTE — LETTER
Attach is the order for pre mop labs of patient Gurdeep Sagastume.  He is scheduled for an angiogram in [facility] on 2/27/24.  Please fax results to [phone].  For any question please call [phone].  Mon - Fri 8 am to 5 pm      Thank you.

## (undated) NOTE — LETTER
Attach is the order for pre mop labs of patient Gurdeep Sagastume.  He is scheduled for an angiogram in [facility] on 2/27/24.  Please fax results to [phone].  For any question please call [phone].  Mon - Fri 8 am to 5 pm      Thank you.